# Patient Record
Sex: FEMALE | Race: WHITE
[De-identification: names, ages, dates, MRNs, and addresses within clinical notes are randomized per-mention and may not be internally consistent; named-entity substitution may affect disease eponyms.]

---

## 2022-04-30 ENCOUNTER — HOSPITAL ENCOUNTER (EMERGENCY)
Dept: HOSPITAL 97 - ER | Age: 42
Discharge: HOME | End: 2022-04-30
Payer: SELF-PAY

## 2022-04-30 VITALS — DIASTOLIC BLOOD PRESSURE: 85 MMHG | SYSTOLIC BLOOD PRESSURE: 131 MMHG

## 2022-04-30 VITALS — OXYGEN SATURATION: 97 %

## 2022-04-30 VITALS — TEMPERATURE: 97.9 F

## 2022-04-30 DIAGNOSIS — Z88.8: ICD-10-CM

## 2022-04-30 DIAGNOSIS — M50.10: Primary | ICD-10-CM

## 2022-04-30 DIAGNOSIS — Z85.118: ICD-10-CM

## 2022-04-30 DIAGNOSIS — Z88.2: ICD-10-CM

## 2022-04-30 DIAGNOSIS — Z87.891: ICD-10-CM

## 2022-04-30 PROCEDURE — 99284 EMERGENCY DEPT VISIT MOD MDM: CPT

## 2022-04-30 NOTE — ER
Nurse's Notes                                                                                     

 [facility]                                                                 

Name: Karo Beckham                                                                          

Age: 42 yrs                                                                                       

Sex: Female                                                                                       

: 1980                                                                                   

MRN: [id number]                                                                                   

Arrival Date: 2022                                                                          

Time: 10:03                                                                                       

Account#: [id number]                                                                            

Bed 13                                                                                            

Private MD: Saloni High                                                                        

Diagnosis: Cervical disc disorder with radiculopathy                                              

                                                                                                  

Presentation:                                                                                     

                                                                                             

10:10 Chief complaint: Patient states: "I was told I had messed up vertebrae in my neck and i ab2 

      need surgery but they wont operate until they now what is wrong for sure. I was given       

      tramadol and gabapentin but its not helping, I was also diagnosed with lung cancer on       

      Tuesday." Pt c/o neck pain that radiates into her left shoulder. Coronavirus screen:        

      Vaccine status: Patient reports being unvaccinated. Client denies travel out of the         

      [place] in the last 14 days. At this time, the client does not indicate any symptoms           

      associated with coronavirus-19. Ebola Screen: Patient negative for fever greater than       

      or equal to 101.5 degrees Fahrenheit, and additional compatible Ebola Virus Disease         

      symptoms Patient denies exposure to infectious person. Patient denies travel to an          

      Ebola-affected area in the 21 days before illness onset. No symptoms or risks               

      identified at this time. Initial Sepsis Screen: Does the patient meet any 2 criteria?       

      No. Patient's initial sepsis screen is negative. Does the patient have a suspected          

      source of infection? No. Patient's initial sepsis screen is negative. Risk Assessment:      

      Do you want to hurt yourself or someone else? Patient reports no desire to harm self or     

      others. Onset of symptoms is unknown.                                                       

10:10 Method Of Arrival: Ambulatory                                                           ab2 

10:10 Acuity: GENARO 4                                                                           ab2 

                                                                                                  

Triage Assessment:                                                                                

10:16 General: Appears in no apparent distress. uncomfortable, Behavior is cooperative,       ab2 

      appropriate for age, anxious, crying. General: Behavior is. Pain: Complains of pain in      

      back of neck Pain radiates to left arm. Neuro: Level of Consciousness is awake, alert,      

      obeys commands, Oriented to person, place, time, situation, Appropriate for age.            

      Cardiovascular: No deficits noted. Denies chest pain, shortness of breath, Patient's        

      skin is warm and dry. Respiratory: Airway is patent is compromised Respiratory effort       

      is even, unlabored, Respiratory pattern is regular, symmetrical. GI: No deficits noted.     

      No signs and/or symptoms were reported involving the gastrointestinal system.               

                                                                                                  

OB/GYN:                                                                                           

12:50 LMP N/A - Irregular menses                                                              jd3 

                                                                                                  

Historical:                                                                                       

- Allergies:                                                                                      

10:14 Sulfa (Sulfonamide Antibiotics);                                                        ab2 

10:14 Compazine;                                                                              ab2 

- PMHx:                                                                                           

10:14 Lung cancer;                                                                            ab2 

                                                                                                  

- Immunization history:: Adult Immunizations up to date.                                          

- Social history:: Smoking status: Patient/guardian denies using tobacco, pt quit                 

  smoking 5days ago.                                                                              

                                                                                                  

                                                                                                  

Screening:                                                                                        

10:37 Abuse screen: Denies threats or abuse. Nutritional screening: No deficits noted.        jd3 

      Tuberculosis screening: No symptoms or risk factors identified. Fall Risk Ambulatory        

      Aid- None/Bed Rest/Nurse Assist (0 pts). Gait- Normal/Bed Rest/Wheelchair (0 pts)           

      Mental Status- Oriented to own ability (0 pts). Total Stroud Fall Scale indicates No         

      Risk (0-24 pts).                                                                            

                                                                                                  

Assessment:                                                                                       

10:25 General: Appears in no apparent distress. comfortable, Behavior is calm, cooperative,   jd3 

      appropriate for age. Pain: Complains of pain in left shoulder and back of neck Quality      

      of pain is described as sharp, shooting. Neuro: Level of Consciousness is awake, alert,     

      obeys commands, Oriented to person, place, time, situation. Cardiovascular: Denies          

      chest pain, Capillary refill < 3 seconds Patient's skin is warm and dry. Respiratory:       

      Airway is patent Respiratory effort is even, unlabored, Respiratory pattern is regular,     

      symmetrical, Denies cough, shortness of breath. GI: No signs and/or symptoms were           

      reported involving the gastrointestinal system. : No signs and/or symptoms were           

      reported regarding the genitourinary system. EENT: No signs and/or symptoms were            

      reported regarding the EENT system. Derm: Skin is intact, Skin is dry, Skin is normal,      

      Skin temperature is warm. Musculoskeletal: Circulation, motion, and sensation intact.       

      Range of motion: intact in all extremities.                                                 

11:40 Reassessment: Patient appears in no apparent distress at this time. Patient and/or      jd3 

      family updated on plan of care and expected duration. Pain level reassessed. Patient is     

      alert, oriented x 3, equal unlabored respirations, skin warm/dry/pink. Patient states       

      feeling better.                                                                             

12:51 Reassessment: Patient appears in no apparent distress at this time. Patient and/or      jd3 

      family updated on plan of care and expected duration. Pain level reassessed. Patient is     

      alert, oriented x 3, equal unlabored respirations, skin warm/dry/pink. Patient states       

      feeling better.                                                                             

                                                                                                  

Vital Signs:                                                                                      

10:10  / 100; Pulse 99; Resp 19; Temp 97.9; Pulse Ox 98% on R/A; Weight 56.7 kg; Height ab2 

      5 ft. 5 in. (165.10 cm); Pain 10/10;                                                        

11:40  / 94; Pulse 68; Resp 17 S; Pulse Ox 97% on R/A;                                  jd3 

12:51  / 85; Pulse 65; Resp 17 S; Pulse Ox 97% on R/A;                                  jd3 

10:10 Body Mass Index 20.80 (56.70 kg, 165.10 cm)                                             ab2 

                                                                                                  

ED Course:                                                                                        

10:03 Patient arrived in ED.                                                                  am2 

10:04 Saloni High MD is Private Physician.                                                am2 

10:05 Mike Bernabe NP is [facility].                                                           pm1 

10:05 Jorge Au MD is Attending Physician.                                              pm1 

10:14 Triage completed.                                                                       ab2 

10:17 Arm band placed on right wrist.                                                         ab2 

10:20 Anson Redman RN is Primary Nurse.                                                  jd3 

10:25 Inserted saline lock: 20 gauge in right antecubital area, using aseptic technique.      jd3 

      Blood collected.                                                                            

10:37 Patient has correct armband on for positive identification. Bed in low position. Call   jd3 

      light in reach. Side rails up X 1. Adult w/ patient. Pulse ox on. NIBP on.                  

12:51 No provider procedures requiring assistance completed. IV discontinued, intact,         jd3 

      bleeding controlled, No redness/swelling at site. Pressure dressing applied.                

                                                                                                  

Administered Medications:                                                                         

10:34 Drug: Lidoderm Patch 5 % (700 mg/patch) 1 patches Route: Topical; Site: affected area;  jd3 

11:30 Follow up: Response: No adverse reaction                                                jd3 

10:35 Drug: morphine 4 mg Route: IVP; Site: left antecubital;                                 jd3 

11:30 Follow up: Response: No adverse reaction; RASS: Alert and Calm (0)                      jd3 

10:35 Drug: Zofran (Ondansetron) 4 mg Route: IVP; Site: left antecubital;                     jd3 

11:35 Follow up: Response: No adverse reaction                                                jd3 

11:56 Drug: Ketorolac 30 mg Route: IVP; Site: right antecubital;                              jd3 

12:52 Follow up: Response: No adverse reaction                                                jd3 

                                                                                                  

                                                                                                  

Outcome:                                                                                          

12:21 Discharge ordered by MD.                                                                pm1 

12:51 Discharged to home ambulatory, with family.                                             jd3 

12:51 Condition: stable                                                                           

12:51 Discharge instructions given to patient, family, Instructed on discharge instructions,      

      follow up and referral plans. medication usage, Demonstrated understanding of               

      instructions, follow-up care, medications, Prescriptions given X 1.                         

12:52 Patient left the ED.                                                                    jd3 

                                                                                                  

Signatures:                                                                                       

Mike Bernabe NP                    NP   pm1                                                  

Karen Cho                               am2                                                  

Anson Redman RN                    RN   jd3                                                  

Ricky Rosario                           ab2                                                  

                                                                                                  

Corrections: (The following items were deleted from the chart)                                    

10:16 10:14 Allergies: No Known Allergies; ab2                                                ab2 

                                                                                                  

**************************************************************************************************

## 2022-04-30 NOTE — EDPHYS
Physician Documentation                                                                           

 [facility]                                                                 

Name: Karo Beckham                                                                          

Age: 42 yrs                                                                                       

Sex: Female                                                                                       

: 1980                                                                                   

MRN: [id number]                                                                                   

Arrival Date: 2022                                                                          

Time: 10:03                                                                                       

Account#: [id number]                                                                            

Bed 13                                                                                            

Private MD: Saloni High                                                                        

ED Physician Jorge Au                                                                       

HPI:                                                                                              

                                                                                             

10:19 This 42 yrs old Female presents to ER via Ambulatory with complaints of Neck Problem,   pm1 

      Neck Pain, <24hrs Old.                                                                      

10:19 The patient or guardian complains of pain. The symptoms are located neck pain and left  pm1 

      shoulder. Onset: The symptoms/episode began/occurred 3-4 months of the same pain.           

      Context: The problem was sustained at an unknown location, The neck injury/problem          

      resulted from from unknown cause. Associated signs and symptoms: Pertinent negatives:       

      numbness, tingling. Modifying factors: The symptoms are alleviated by nothing. the          

      symptoms are aggravated by movement, lying down. Severity of symptoms: in the emergency     

      department the symptoms are actually worse. The patient has been recently seen by a         

      physician: Dr. Dong for the same complaint and had MRI of left shoulder on 4/3/2022 and     

      was treated with prescription medications and rehabilitation. Then had MRI of neck on       

      2022 when she had no improvement from rehabilitation and the findings show            

      herniation of disk. Patient presenting here to the ER today for pain management.            

      Patient was sent to pain management from orthopedics but was not treated because she        

      was told that the issue is surgical.                                                        

                                                                                                  

OB/GYN:                                                                                           

12:50 LMP N/A - Irregular menses                                                              jd3 

                                                                                                  

Historical:                                                                                       

- Allergies:                                                                                      

10:14 Sulfa (Sulfonamide Antibiotics);                                                        ab2 

10:14 Compazine;                                                                              ab2 

- PMHx:                                                                                           

10:14 Lung cancer;                                                                            ab2 

                                                                                                  

- Immunization history:: Adult Immunizations up to date.                                          

- Social history:: Smoking status: Patient/guardian denies using tobacco, pt quit                 

  smoking 5days ago.                                                                              

                                                                                                  

                                                                                                  

ROS:                                                                                              

10:19 Constitutional: Negative for fever, chills, and weight loss, Cardiovascular: Negative   pm1 

      for chest pain, palpitations, and edema, Respiratory: Negative for shortness of breath,     

      cough, wheezing, and pleuritic chest pain.                                                  

10:19 Abdomen/GI: Negative for abdominal pain, nausea, vomiting, diarrhea, and constipation,      

      Back: Negative for injury and pain.                                                         

10:19 Skin: Negative for injury, rash, and discoloration, Neuro: Negative for headache,           

      weakness, numbness, tingling, and seizure.                                                  

10:19 Neck: Positive for pain with movement, pain at rest.                                        

10:19 MS/extremity: Positive for pain, of the left shoulder, Negative for injury or acute         

      deformity, decreased range of motion, deformity.                                            

10:19 All other systems are negative.                                                             

                                                                                                  

Exam:                                                                                             

10:19 Constitutional:  This is a well developed, well nourished patient who is awake, alert,  pm1 

      and in no acute distress. Head/Face:  Normocephalic, atraumatic.                            

10:19 Skin:  Warm, dry with normal turgor.  Normal color with no rashes, no lesions, and no       

      evidence of cellulitis. MS/ Extremity:  Pulses equal, no cyanosis.  Neurovascular           

      intact.  Full, normal range of motion.                                                      

10:19 Neck: C-spine: vertebral tenderness, is not appreciated, ROM/movement: no acute changes.    

10:19 Cardiovascular: Exam negative for  acute changes, Rate: normal, Rhythm: regular,            

      Pulses: no pulse deficits are appreciated.                                                  

10:19 Respiratory: Exam negative for  acute changes, respiratory distress, shortness of           

      breath, Breath sounds: are clear throughout.                                                

10:19 Neuro: Exam negative for acute changes, Orientation: is normal, Mentation: is normal,       

      Motor: moves all fours.                                                                     

                                                                                                  

Vital Signs:                                                                                      

10:10  / 100; Pulse 99; Resp 19; Temp 97.9; Pulse Ox 98% on R/A; Weight 56.7 kg; Height ab2 

      5 ft. 5 in. (165.10 cm); Pain 10/10;                                                        

11:40  / 94; Pulse 68; Resp 17 S; Pulse Ox 97% on R/A;                                  jd3 

12:51  / 85; Pulse 65; Resp 17 S; Pulse Ox 97% on R/A;                                  jd3 

10:10 Body Mass Index 20.80 (56.70 kg, 165.10 cm)                                             ab2 

                                                                                                  

MDM:                                                                                              

10:11 Patient medically screened.                                                             pm1 

10:18 Data reviewed: vital signs. Data interpreted: Pulse oximetry: on room air is 98 %.      pm1 

      Interpretation: normal.                                                                     

11:46 ED course: Patient reports improvement in her pain with medication given in the ER.     pm1 

      Patient will need to follow up with neurosurgery for definitive care.                       

12:21 Counseling: I had a detailed discussion with the patient and/or guardian regarding: the pm1 

      historical points, exam findings, and any diagnostic results supporting the                 

      discharge/admit diagnosis, the need for outpatient follow up, for definitive care, a        

      neurosurgeon, to return to the emergency department if symptoms worsen or persist or if     

      there are any questions or concerns that arise at home.                                     

                                                                                                  

Administered Medications:                                                                         

10:34 Drug: Lidoderm Patch 5 % (700 mg/patch) 1 patches Route: Topical; Site: affected area;  jd3 

11:30 Follow up: Response: No adverse reaction                                                jd3 

10:35 Drug: morphine 4 mg Route: IVP; Site: left antecubital;                                 jd3 

11:30 Follow up: Response: No adverse reaction; RASS: Alert and Calm (0)                      jd3 

10:35 Drug: Zofran (Ondansetron) 4 mg Route: IVP; Site: left antecubital;                     jd3 

11:35 Follow up: Response: No adverse reaction                                                jd3 

11:56 Drug: Ketorolac 30 mg Route: IVP; Site: right antecubital;                              jd3 

12:52 Follow up: Response: No adverse reaction                                                jd3 

                                                                                                  

                                                                                                  

Disposition:                                                                                      

15:48 Co-signature as Attending Physician, Jorge Au MD I agree with the assessment and   kdr 

      plan of care.                                                                               

                                                                                                  

Disposition Summary:                                                                              

22 12:21                                                                                    

Discharge Ordered                                                                                 

      Location: Home                                                                          pm1 

      Problem: new                                                                            pm1 

      Symptoms: have improved                                                                 pm1 

      Condition: Stable                                                                       pm1 

      Diagnosis                                                                                   

        - Cervical disc disorder with radiculopathy                                           pm1 

      Followup:                                                                               pm1 

        - With: Emergency Department                                                               

        - When: As needed                                                                          

        - Reason: Worsening of condition                                                           

      Followup:                                                                               pm1 

        - With: Private Physician                                                                  

        - When: 2 - 3 days                                                                         

        - Reason: Recheck today's complaints, Continuance of care, Re-evaluation by your           

      physician                                                                                   

      Discharge Instructions:                                                                     

        - Discharge Summary Sheet                                                             pm1 

        - Cervical Radiculopathy                                                              pm1 

      Forms:                                                                                      

        - Medication Reconciliation Form                                                      pm1 

        - Thank You Letter                                                                    pm1 

        - Antibiotic Education                                                                pm1 

        - Prescription Opioid Use                                                             pm1 

      Prescriptions:                                                                              

        - Tylenol-Codeine #3 300 mg-30 mg Oral                                                     

            - take 2 tablet by ORAL route every 6 hours As needed; 20 tablet; Refills: 0,     pm1 

      Product Selection Permitted                                                                 

Signatures:                                                                                       

Jorge Au MD MD kdr Marinas, Patrick, NP                    NP   pm1                                                  

Redman, Anson, RN                    RN   jd3                                                  

Ricky Rosario                           ab2                                                  

                                                                                                  

Corrections: (The following items were deleted from the chart)                                    

10:16 10:14 Allergies: No Known Allergies; ab2                                                ab2 

                                                                                                  

**************************************************************************************************

## 2022-05-07 ENCOUNTER — HOSPITAL ENCOUNTER (EMERGENCY)
Dept: HOSPITAL 97 - ER | Age: 42
Discharge: HOME | End: 2022-05-07
Payer: SELF-PAY

## 2022-05-07 VITALS — OXYGEN SATURATION: 99 % | DIASTOLIC BLOOD PRESSURE: 87 MMHG | SYSTOLIC BLOOD PRESSURE: 139 MMHG

## 2022-05-07 VITALS — TEMPERATURE: 98.4 F

## 2022-05-07 DIAGNOSIS — Z85.118: ICD-10-CM

## 2022-05-07 DIAGNOSIS — Z88.8: ICD-10-CM

## 2022-05-07 DIAGNOSIS — M50.10: Primary | ICD-10-CM

## 2022-05-07 DIAGNOSIS — Z88.2: ICD-10-CM

## 2022-05-07 PROCEDURE — 99283 EMERGENCY DEPT VISIT LOW MDM: CPT

## 2022-05-07 PROCEDURE — 96374 THER/PROPH/DIAG INJ IV PUSH: CPT

## 2022-05-07 PROCEDURE — 93005 ELECTROCARDIOGRAM TRACING: CPT

## 2022-05-07 PROCEDURE — 96375 TX/PRO/DX INJ NEW DRUG ADDON: CPT

## 2022-05-07 NOTE — ER
Nurse's Notes                                                                                     

 [facility]                                                                 

Name: Karo Beckham                                                                          

Age: 42 yrs                                                                                       

Sex: Female                                                                                       

: 1980                                                                                   

MRN: [id number]                                                                                   

Arrival Date: 2022                                                                          

Time: 18:02                                                                                       

Account#: [id number]                                                                            

Bed 13                                                                                            

Private MD:                                                                                       

Diagnosis: Cervical disc disorder with radiculopathy                                              

                                                                                                  

Presentation:                                                                                     

                                                                                             

18:14 Chief complaint: Patient states: L neck and shoulder pain x 5 months with increasing    ss  

      pain over the past few days. Pt has had an MRI and followed up with an ortho doctor who     

      instructed her that if her pain is unbearable to come to ED. Coronavirus screen: Client     

      denies travel out of the [place] in the last 14 days. Ebola Screen: Patient denies             

      exposure to infectious person. Patient denies travel to an Ebola-affected area in the       

      21 days before illness onset. Initial Sepsis Screen: Does the patient meet any 2            

      criteria? No. Patient's initial sepsis screen is negative. Does the patient have a          

      suspected source of infection? No. Patient's initial sepsis screen is negative. Risk        

      Assessment: Do you want to hurt yourself or someone else? Patient reports no desire to      

      harm self or others. Onset of symptoms is unknown.                                          

18:14 Method Of Arrival: Ambulatory                                                           ss  

18:14 Acuity: GENARO 3                                                                           ss  

                                                                                                  

OB/GYN:                                                                                           

20:15 LMP                                                                                 sm5 

                                                                                                  

Historical:                                                                                       

- Allergies:                                                                                      

18:16 Compazine;                                                                              ss  

18:16 Sulfa (Sulfonamide Antibiotics);                                                        ss  

- PMHx:                                                                                           

18:16 Lung Cancer;                                                                            ss  

                                                                                                  

- Immunization history:: Client reports having NOT received the Covid vaccine.                    

- Social history:: Smoking status: Patient/guardian denies using tobacco, Stopped _               

  months ago .5.                                                                                  

                                                                                                  

                                                                                                  

Screenin:45 Abuse screen: Denies threats or abuse. Denies injuries from another. Nutritional        sm5 

      screening: No deficits noted. Tuberculosis screening: No symptoms or risk factors           

      identified. Fall Risk None identified.                                                      

                                                                                                  

Assessment:                                                                                       

19:44 General: Appears in no apparent distress. Behavior is cooperative. Pain: Complains of   sm5 

      pain in back, chest and left arm. Neuro: No deficits noted. Level of Consciousness is       

      awake, alert, obeys commands, Oriented to person, place, time, situation.                   

      Cardiovascular: Reports chest heaviness Capillary refill < 3 seconds Patient's skin is      

      warm and dry. Respiratory: No deficits noted. Airway is patent Trachea midline              

      Respiratory effort is even, unlabored. GI: No deficits noted. Musculoskeletal: Reports      

      pain in back and left arm.                                                                  

20:15 Reassessment: No changes from previously documented assessment. Patient and/or family   5 

      updated on plan of care and expected duration. Pain level reassessed.                       

                                                                                                  

Vital Signs:                                                                                      

18:14  / 96; Pulse 96; Resp 18; Temp 98.4(TE); Pulse Ox 97% on R/A; Weight 56.7 kg;     ss  

      Height 5 ft. 5 in. (165.10 cm); Pain 10/10;                                                 

20:15  / 87; Pulse 92; Resp 17; Pulse Ox 99% on R/A;                                    sm5 

18:14 Body Mass Index 20.80 (56.70 kg, 165.10 cm)                                               

                                                                                                  

ED Course:                                                                                        

18:02 Patient arrived in ED.                                                                  ds1 

18:16 Triage completed.                                                                       ss  

18:16 Arm band placed on right wrist.                                                         ss  

18:26 Mike Bernabe NP is PHCP.                                                           pm1 

18:27 Renato iEsenberg MD is Attending Physician.                                             pm1 

18:57 Patient placed in an exam room, on a stretcher.                                         ll1 

19:03 Inserted saline lock: 20 gauge in right antecubital area, using aseptic technique.      mb7 

19:11 Tamara Sainz, RN is Primary Nurse.                                                      sm5 

19:46 Patient has correct armband on for positive identification. Bed in low position. Call   sm5 

      light in reach. Side rails up X2.                                                           

20:16 No provider procedures requiring assistance completed. IV discontinued, intact,         sm5 

      bleeding controlled, No redness/swelling at site. Pressure dressing applied.                

                                                                                                  

Administered Medications:                                                                         

19:24 Drug: Lidoderm Patch 5 % (700 mg/patch) 1 patches {Note: left shoulder blade .} Route:  sm5 

      Topical; Site: affected area;                                                               

20:14 Follow up: Response: No adverse reaction                                                5 

19:25 Drug: morphine 4 mg Route: IVP; Site: right antecubital;                                sm5 

20:14 Follow up: Response: Pain is unchanged, physician notified                              5 

19:25 Drug: Zofran (Ondansetron) 4 mg Route: IVP; Site: right antecubital;                    sm5 

20:14 Follow up: Response: No adverse reaction                                                5 

19:25 Drug: Ketorolac 30 mg Route: IVP; Site: right antecubital;                              5 

20:13 Follow up: Response: No adverse reaction                                                5 

20:10 Drug: fentaNYL (PF) 25 mcg Route: IVP; Site: right antecubital;                         5 

20:16 Follow up: Response: Pain is decreased                                                  [facility] 

                                                                                                  

                                                                                                  

Outcome:                                                                                          

19:59 Discharge ordered by MD.                                                                pm1 

20:16 Discharged to home ambulatory.                                                          5 

20:16 Condition: stable                                                                           

20:16 Discharge instructions given to patient, Instructed on discharge instructions, follow       

      up and referral plans. medication usage, Demonstrated understanding of instructions,        

      follow-up care, medications, Prescriptions given X 3.                                       

20:16 Patient left the ED.                                                                    [facility] 

                                                                                                  

Signatures:                                                                                       

Janett Jerez                                ds1                                                  

Janell Broderick RN                      RN   Mike Gregory, ERICA                    NP   pm1                                                  

Maria Del Carmen Hassan RN RN   1                                                  

Destiny Douglas                               mb7                                                  

Tamara Sainz RN RN   sm5                                                  

                                                                                                  

**************************************************************************************************

## 2022-05-07 NOTE — EDPHYS
Physician Documentation                                                                           

 [facility]                                                                 

Name: Karo Beckham                                                                          

Age: 42 yrs                                                                                       

Sex: Female                                                                                       

: 1980                                                                                   

MRN: [id number]                                                                                   

Arrival Date: 2022                                                                          

Time: 18:02                                                                                       

Account#: [id number]                                                                            

Bed 13                                                                                            

Private MD:                                                                                       

Renato Lenz                                                                      

HPI:                                                                                              

                                                                                             

18:40 This 42 yrs old Female presents to ER via Ambulatory with complaints of Back Pain,      pm1 

      Shoulder Pain.                                                                              

18:40 The patient presents with pain that is chronic, with no known mechanism of injury. The  pm1 

      symptoms are located in the neck pain and left shoulder. Onset: The symptoms/episode        

      began/occurred 3-4 months of the same pain. Radiation to back and left shoulder.            

      Associated signs and symptoms: Pertinent negatives: numbness, tingling. Modifying           

      factors: The patient symptoms are alleviated by nothing. Severity of symptoms: in the       

      emergency department the symptoms are actually worse. The patient has been recently         

      seen by a physician: a neurosurgeon, with similar presenting complaints, patient with       

      zoom visit with neurosurgery and was referred to pain management for steroid injections     

      to the neck. Patient has pending appointment for pet scan next week. Patient does not       

      have any pain medications at home currently.                                                

                                                                                                  

OB/GYN:                                                                                           

20:15 LMP                                                                                 sm5 

                                                                                                  

Historical:                                                                                       

- Allergies:                                                                                      

18:16 Compazine;                                                                              ss  

18:16 Sulfa (Sulfonamide Antibiotics);                                                        ss  

- PMHx:                                                                                           

18:16 Lung Cancer;                                                                            ss  

                                                                                                  

- Immunization history:: Client reports having NOT received the Covid vaccine.                    

- Social history:: Smoking status: Patient/guardian denies using tobacco, Stopped _               

  months ago .5.                                                                                  

                                                                                                  

                                                                                                  

ROS:                                                                                              

18:40 Constitutional: Negative for fever, chills, and weight loss.                            pm1 

18:40 Respiratory: Negative for shortness of breath, cough, wheezing, and pleuritic chest         

      pain, Abdomen/GI: Negative for abdominal pain, nausea, vomiting, diarrhea, and              

      constipation.                                                                               

18:40 MS/Extremity: Negative for injury and deformity, Skin: Negative for injury, rash, and       

      discoloration, Neuro: Negative for headache, weakness, numbness, tingling, and seizure.     

18:40 Neck: Positive for of the left trapezius.                                                   

18:40 Cardiovascular: Positive for chest pain, of the left supraclavicular area and left          

      clavicle - reports that it feels bigger than the other side.                                

18:40 Back: Positive for of the thoracic area.                                                    

18:40 All other systems are negative.                                                             

                                                                                                  

Exam:                                                                                             

18:40 Constitutional:  This is a well developed, well nourished patient who is awake, alert,  pm1 

      and in no acute distress. Head/Face:  Normocephalic, atraumatic.                            

18:40 Skin:  Warm, dry with normal turgor.  Normal color with no rashes, no lesions, and no       

      evidence of cellulitis. MS/ Extremity:  Pulses equal, no cyanosis.  Neurovascular           

      intact.  Full, normal range of motion.                                                      

18:40 Neck: External neck: tenderness, of the  left trapezius.                                    

18:40 Chest/axilla: Inspection: normal, Palpation: tenderness, that is mild, of the  left         

      supraclavicular area and left clavicle, that totally reproduces the patient's               

      complaints.                                                                                 

18:40 Cardiovascular: Exam negative for  acute changes, Rate: normal, Rhythm: regular,            

      Pulses: no pulse deficits are appreciated.                                                  

18:40 Respiratory: Exam negative for  acute changes, respiratory distress, shortness of           

      breath.                                                                                     

18:40 Abdomen/GI: Exam negative for acute changes, Inspection: abdomen appears normal,            

      Palpation: abdomen is soft and non-tender, in all quadrants.                                

18:40 Back: muscle spasm, is appreciated in the left trapezius, left scapular area and left       

      subscapular area.                                                                           

18:40 Neuro: Exam negative for acute changes, Orientation: is normal, Mentation: is normal,       

      Motor: is normal, moves all fours.                                                          

                                                                                                  

Vital Signs:                                                                                      

18:14  / 96; Pulse 96; Resp 18; Temp 98.4(TE); Pulse Ox 97% on R/A; Weight 56.7 kg;     ss  

      Height 5 ft. 5 in. (165.10 cm); Pain 10/10;                                                 

20:15  / 87; Pulse 92; Resp 17; Pulse Ox 99% on R/A;                                    sm5 

18:14 Body Mass Index 20.80 (56.70 kg, 165.10 cm)                                             ss  

                                                                                                  

MDM:                                                                                              

18:57 Patient medically screened.                                                             pm1 

19:50 Data reviewed: vital signs. Data interpreted: Pulse oximetry: on room air is 97 %.      pm1 

      Interpretation: normal.                                                                     

19:57 Counseling: I had a detailed discussion with the patient and/or guardian regarding: the pm1 

      historical points, exam findings, and any diagnostic results supporting the                 

      discharge/admit diagnosis, the need for outpatient follow up, a neurosurgeon, a pain        

      , to return to the emergency department if symptoms worsen or          

      persist or if there are any questions or concerns that arise at home.                       

20:03 ED course: pmpaware reviewed.                                                           pm1 

                                                                                                  

                                                                                             

18:39 Order name: IV Saline Lock; Complete Time: 19:03                                        pm1 

                                                                                             

19:25 Order name: EKG - Nurse/Tech; Complete Time: 19:38                                      sm5 

                                                                                                  

EC:42 Rate is 80 beats/min. Rhythm is regular, Normal Sinus Rhythm with No ectopy. QRS        pm1 

      interval is normal. No Q waves. T waves are Normal. No ST changes noted. Clinical           

      impression: Normal ECG.                                                                     

                                                                                                  

Administered Medications:                                                                         

19:24 Drug: Lidoderm Patch 5 % (700 mg/patch) 1 patches {Note: left shoulder blade .} Route:  sm5 

      Topical; Site: affected area;                                                               

20:14 Follow up: Response: No adverse reaction                                                sm5 

19:25 Drug: morphine 4 mg Route: IVP; Site: right antecubital;                                sm5 

20:14 Follow up: Response: Pain is unchanged, physician notified                              sm5 

19:25 Drug: Zofran (Ondansetron) 4 mg Route: IVP; Site: right antecubital;                    sm5 

20:14 Follow up: Response: No adverse reaction                                                sm5 

19:25 Drug: Ketorolac 30 mg Route: IVP; Site: right antecubital;                              sm5 

20:13 Follow up: Response: No adverse reaction                                                sm5 

20:10 Drug: fentaNYL (PF) 25 mcg Route: IVP; Site: right antecubital;                         sm5 

20:16 Follow up: Response: Pain is decreased                                                  sm5 

                                                                                                  

                                                                                                  

Disposition Summary:                                                                              

22 19:59                                                                                    

Discharge Ordered                                                                                 

      Location: Home                                                                          pm1 

      Problem: new                                                                            pm1 

      Symptoms: have improved                                                                 pm1 

      Condition: Stable                                                                       pm1 

      Diagnosis                                                                                   

        - Cervical disc disorder with radiculopathy                                           pm1 

      Followup:                                                                               pm1 

        - With: Emergency Department                                                               

        - When: As needed                                                                          

        - Reason: Worsening of condition                                                           

      Followup:                                                                               pm1 

        - With: Private Physician                                                                  

        - When: 2 - 3 days                                                                         

        - Reason: Recheck today's complaints, Continuance of care, Re-evaluation by your           

      physician                                                                                   

      Discharge Instructions:                                                                     

        - Discharge Summary Sheet                                                             pm1 

        - Cervical Radiculopathy                                                              pm1 

      Forms:                                                                                      

        - Medication Reconciliation Form                                                      pm1 

        - Thank You Letter                                                                    pm1 

        - Antibiotic Education                                                                pm1 

        - Prescription Opioid Use                                                             pm1 

      Prescriptions:                                                                              

        - Lidoderm 5 % Topical adhesive patch,medicated                                            

            - apply 1 patch by TRANSDERMAL route once daily As needed 12 hours on and 12      pm1 

      hours off in a 24 hour period; 10 patch; Refills: 0, Product Selection Permitted            

        - Cyclobenzaprine 10 mg Oral Tablet                                                        

            - take 1 tablet by ORAL route every 8 hours As needed; 30 tablet; Refills: 0,     pm1 

      Product Selection Permitted                                                                 

        - Tylenol-Codeine #3 300 mg-30 mg Oral                                                     

            - take 2 tablet by ORAL route every 6 hours As needed; 20 tablet; Refills: 0,     pm1 

      Product Selection Permitted                                                                 

Signatures:                                                                                       

Janell Broderick RN                      RN   ss                                                   

Mike Bernabe, ERICA                    NP   pm1                                                  

Tamara Sainz RN                        RN   sm5                                                  

                                                                                                  

**************************************************************************************************

## 2022-05-09 NOTE — EKG
Test Date:    2022-05-07               Test Time:    19:35:11

Technician:   JACKIE                                     

                                                     

MEASUREMENT RESULTS:                                       

Intervals:                                           

Rate:         80                                     

WV:           136                                    

QRSD:         96                                     

QT:           372                                    

QTc:          429                                    

Axis:                                                

P:            63                                     

WV:           136                                    

QRS:          24                                     

T:            47                                     

                                                     

INTERPRETIVE STATEMENTS:                                       

                                                     

Normal sinus rhythm

Normal ECG

No previous ECG available for comparison



Electronically Signed On 05-09-22 08:56:53 CDT by Tom Fonseca

## 2022-05-16 ENCOUNTER — HOSPITAL ENCOUNTER (EMERGENCY)
Dept: HOSPITAL 97 - ER | Age: 42
Discharge: HOME | End: 2022-05-16
Payer: SELF-PAY

## 2022-05-16 VITALS — OXYGEN SATURATION: 96 %

## 2022-05-16 VITALS — TEMPERATURE: 97.8 F | DIASTOLIC BLOOD PRESSURE: 105 MMHG | SYSTOLIC BLOOD PRESSURE: 133 MMHG

## 2022-05-16 DIAGNOSIS — Z20.822: ICD-10-CM

## 2022-05-16 DIAGNOSIS — J91.8: ICD-10-CM

## 2022-05-16 DIAGNOSIS — Z88.2: ICD-10-CM

## 2022-05-16 DIAGNOSIS — Z85.118: ICD-10-CM

## 2022-05-16 DIAGNOSIS — Z85.72: ICD-10-CM

## 2022-05-16 DIAGNOSIS — J98.11: Primary | ICD-10-CM

## 2022-05-16 DIAGNOSIS — K59.09: ICD-10-CM

## 2022-05-16 LAB
ALBUMIN SERPL BCP-MCNC: 3.3 G/DL (ref 3.4–5)
ALP SERPL-CCNC: 87 U/L (ref 45–117)
ALT SERPL W P-5'-P-CCNC: 32 U/L (ref 12–78)
AST SERPL W P-5'-P-CCNC: 16 U/L (ref 15–37)
BUN BLD-MCNC: 10 MG/DL (ref 7–18)
GLUCOSE SERPLBLD-MCNC: 105 MG/DL (ref 74–106)
HCT VFR BLD CALC: 38.8 % (ref 36–45)
INR BLD: 1.09
LIPASE SERPL-CCNC: 44 U/L (ref 73–393)
LYMPHOCYTES # SPEC AUTO: 1.9 K/UL (ref 0.7–4.9)
MAGNESIUM SERPL-MCNC: 2 MG/DL (ref 1.8–2.4)
PMV BLD: 7.2 FL (ref 7.6–11.3)
POTASSIUM SERPL-SCNC: 3.2 MMOL/L (ref 3.5–5.1)
RBC # BLD: 4.49 M/UL (ref 3.86–4.86)
SP GR UR: 1.02 (ref 1–1.03)
TROPONIN I SERPL HS-MCNC: < 3 PG/ML (ref ?–58.9)

## 2022-05-16 PROCEDURE — 81003 URINALYSIS AUTO W/O SCOPE: CPT

## 2022-05-16 PROCEDURE — 83605 ASSAY OF LACTIC ACID: CPT

## 2022-05-16 PROCEDURE — 87040 BLOOD CULTURE FOR BACTERIA: CPT

## 2022-05-16 PROCEDURE — 96374 THER/PROPH/DIAG INJ IV PUSH: CPT

## 2022-05-16 PROCEDURE — 96375 TX/PRO/DX INJ NEW DRUG ADDON: CPT

## 2022-05-16 PROCEDURE — 85025 COMPLETE CBC W/AUTO DIFF WBC: CPT

## 2022-05-16 PROCEDURE — 84484 ASSAY OF TROPONIN QUANT: CPT

## 2022-05-16 PROCEDURE — 87804 INFLUENZA ASSAY W/OPTIC: CPT

## 2022-05-16 PROCEDURE — 84145 PROCALCITONIN (PCT): CPT

## 2022-05-16 PROCEDURE — 74177 CT ABD & PELVIS W/CONTRAST: CPT

## 2022-05-16 PROCEDURE — 71275 CT ANGIOGRAPHY CHEST: CPT

## 2022-05-16 PROCEDURE — 83735 ASSAY OF MAGNESIUM: CPT

## 2022-05-16 PROCEDURE — 81025 URINE PREGNANCY TEST: CPT

## 2022-05-16 PROCEDURE — 83690 ASSAY OF LIPASE: CPT

## 2022-05-16 PROCEDURE — 80053 COMPREHEN METABOLIC PANEL: CPT

## 2022-05-16 PROCEDURE — 85730 THROMBOPLASTIN TIME PARTIAL: CPT

## 2022-05-16 PROCEDURE — 36415 COLL VENOUS BLD VENIPUNCTURE: CPT

## 2022-05-16 PROCEDURE — 71045 X-RAY EXAM CHEST 1 VIEW: CPT

## 2022-05-16 PROCEDURE — 93005 ELECTROCARDIOGRAM TRACING: CPT

## 2022-05-16 PROCEDURE — 85610 PROTHROMBIN TIME: CPT

## 2022-05-16 PROCEDURE — 82947 ASSAY GLUCOSE BLOOD QUANT: CPT

## 2022-05-16 PROCEDURE — 99284 EMERGENCY DEPT VISIT MOD MDM: CPT

## 2022-05-16 NOTE — RAD REPORT
EXAM DESCRIPTION:

RAD - Chest Single View - 5/16/2022 4:51 am

 

CLINICAL HISTORY:  42 years Female, FEVER

 

COMPARISON:  None

 

TECHNIQUE:  Single portable x-ray view of the chest performed on 5/16/2022 at 3:45 AM

 

FINDINGS:  The lungs are well-expanded. There is volume loss in the left lung base likely due to a co
mbination of pleural fluid and atelectasis. There is no evidence of a pneumothorax.

The cardiac silhouette is prominent and may be accentuated by the portable technique.

There is widening of the superior mediastinum concerning for possible lymphadenopathy or other mass l
esion.

No acute osseous abnormality is identified.

No acute soft tissue abnormalities are seen.

Lines and tubes:   None.

Free air: None

 

IMPRESSION:  1.   Volume loss in the left lung base likely due to a combination of pleural fluid and 
atelectasis.

2.   Widening of the superior mediastinum concerning for possible lymphadenopathy or other mass lesio
n.

3.   Prominence of the cardiac silhouette.

 

Electronically signed by:   Elizabeth Guerrero DO   5/16/2022 5:45 AM CDT Workstation: [id number]

 

 

Due to temporary technical issues with the PACS/Fluency reporting system, reports are being signed by
 the in house radiologist without review as a courtesy to ensure prompt reporting. The interpreting r
adiologist is fully responsible for the content of the report.

## 2022-05-16 NOTE — ER
Nurse's Notes                                                                                     

 [facility]                                                                 

Name: Karo Beckham                                                                          

Age: 42 yrs                                                                                       

Sex: Female                                                                                       

: 1980                                                                                   

MRN: [id number]                                                                                   

Arrival Date: 2022                                                                          

Time: 02:21                                                                                       

Account#: [id number]                                                                            

Bed 3                                                                                             

Private MD:                                                                                       

Diagnosis: Atelectasis;Pleural effusion in other conditions classified elsewhere-history of       

  lymphoma;Abdominal pain, Generalized;Constipation                                               

                                                                                                  

Presentation:                                                                                     

                                                                                             

02:34 Chief complaint: Patient states: I have been constipated for weeks. I have been trying  jb4 

      to take stool softeners and laxatives. Tonight the pain is unbearable. And had low          

      grade fever. I am having chest pain on the left side. Coronavirus screen: At this time,     

      the client does not indicate any symptoms associated with coronavirus-19. Ebola Screen:     

      No symptoms or risks identified at this time. Initial Sepsis Screen: Does the patient       

      meet any 2 criteria? RR > 20 per min. HR > 90 bpm. Yes Does the patient have a              

      suspected source of infection? Yes: Acute abdominal pain If YES to both, name of            

      provider notified: Jadon Keyes MD Risk Assessment: Do you want to hurt yourself or       

      someone else? Patient reports no desire to harm self or others. Onset of symptoms was       

      May 16, 2022.                                                                               

02:34 Method Of Arrival: Ambulatory                                                           jb4 

02:34 Acuity: GENARO 2                                                                           jb4 

                                                                                                  

Triage Assessment:                                                                                

02:35 General: Appears in no apparent distress. uncomfortable, ill, Behavior is cooperative,  jb4 

      anxious, crying. Pain: Complains of pain in low back area, mid back area, left lateral      

      anterior chest, abdomen, left arm and neck Pain does not radiate. Pain currently is 10      

      out of 10 on a pain scale. GI: Abdomen is flat, non-distended, Reports constipation,        

      nausea, Last bm 2-3 weeks ago.                                                              

                                                                                                  

Historical:                                                                                       

- Allergies:                                                                                      

02:38 Compazine;                                                                              jb4 

02:38 Sulfa (Sulfonamide Antibiotics);                                                        jb4 

- Home Meds:                                                                                      

02:38 Colace oral [Active]; gabapentin 300 mg oral cap [Active]; Hydrocodone-Acetaminophen    jb4 

      Oral [Active]; tizanidine oral [Active]; Omeprazole Oral [Active];                          

- PMHx:                                                                                           

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 

                                                                                                  

- Immunization history:: Adult Immunizations up to date.                                          

- Social history:: Smoking status: Patient/guardian denies using tobacco,                         

  Patient/guardian denies using alcohol.                                                          

                                                                                                  

                                                                                                  

Screenin:00 Abuse screen: Denies threats or abuse. Nutritional screening: No deficits noted.        jb4 

      Tuberculosis screening: No symptoms or risk factors identified.                             

03:00 Fall Risk None identified.                                                              jb4 

                                                                                                  

Assessment:                                                                                       

03:00 Reassessment: Pt states " I do not want to be resuscitated if my heart stops.".         jb4 

      Provider notified.                                                                          

03:38 General: Appears uncomfortable, Behavior is cooperative, crying, restless. Pain:        ll3 

      Complains of pain in abdomen and chest and back Pain currently is 10 out of 10 on a         

      pain scale. Neuro: Level of Consciousness is awake, alert, obeys commands, Oriented to      

      person, place, time, situation. GI: Abdomen is round non-distended, Reports lower           

      abdominal pain, upper abdominal pain, constipation, nausea, vomiting. GI: Reports.          

      Derm: Skin is pink, warm \T\ dry. Musculoskeletal: Circulation, motion, and sensation       

      intact.                                                                                     

05:52 Reassessment: No changes from previously documented assessment. Patient and/or family   ll3 

      updated on plan of care and expected duration. Pain level reassessed. Patient is alert,     

      oriented x 3, equal unlabored respirations, skin warm/dry/pink.                             

06:50 Reassessment: Patient appears in no apparent distress at this time. No changes from     lg3 

      previously documented assessment. Patient and/or family updated on plan of care and         

      expected duration. Pain level reassessed. Patient is alert, oriented x 3, equal             

      unlabored respirations, skin warm/dry/pink. pt states pain is still present but is          

      tolerable at this time.                                                                     

06:53 GI: Bowel sounds present X 4 quads. Abd is soft Abdomen is tender to palpation.         lg3 

07:48 Reassessment: Patient appears in no apparent distress at this time. Patient and/or      ph  

      family updated on plan of care and expected duration. Pain level reassessed. Patient is     

      alert, oriented x 3, equal unlabored respirations, skin warm/dry/pink. Pt d/c home w/       

      SO.                                                                                         

                                                                                                  

Vital Signs:                                                                                      

02:34  / 95; Pulse 117; Resp 24; Temp 98.0(TE); Pulse Ox 100% on R/A; Weight 56.7 kg    jb4 

      (R); Height 5 ft. 5 in. (165.10 cm) (R); Pain 10/10;                                        

03:50  / 91; Pulse 92; Resp 28; Pulse Ox 100% on R/A;                                   lg3 

05:49  / 87; Pulse 96; Resp 16; Pulse Ox 96% on R/A;                                    ll3 

06:51  / 97; Pulse 97; Resp 18 S; Pulse Ox 96% on R/A;                                  lg3 

07:46  / 105; Pulse 99; Resp 18; Temp 97.8; Pulse Ox 96% on R/A;                        ph  

02:34 Body Mass Index 20.80 (56.70 kg, 165.10 cm)                                             jb4 

                                                                                                  

ED Course:                                                                                        

02:21 Patient arrived in ED.                                                                  bp1 

02:37 Triage completed.                                                                       jb4 

02:38 Arm band placed on right wrist.                                                         jb4 

02:51 Jadon Keyes MD is Attending Physician.                                             mh7 

03:00 Patient has correct armband on for positive identification. Placed in gown. Bed in low  jb4 

      position. Call light in reach. Side rails up X 1. Client placed on continuous cardiac       

      and pulse oximetry monitoring. NIBP monitoring applied.                                     

03:00 Initial lab(s) drawn, by me, sent to lab. First set of blood cultures drawn by me,      jb4 

      Second set of blood cultures drawn by me. Inserted saline lock: 18 gauge in right           

      antecubital area, using aseptic technique. forearm, using aseptic technique. Blood          

      collected.                                                                                  

03:08 Nell Parsons, RN is Primary Nurse.                                                     lg3 

03:09 Procalcitonin Sent.                                                                     lg3 

03:09 Troponin High Sensitivity Sent.                                                         lg3 

03:10 Blood Culture Adult (2) Sent.                                                           lg3 

03:10 CBC with Diff Sent.                                                                     lg3 

03:10 CMP Sent.                                                                               lg3 

03:10 Lactate Sent.                                                                           lg3 

03:10 Protime (+inr) Sent.                                                                    lg3 

03:10 Ptt, Activated Sent.                                                                    lg3 

03:32 SARS-COV-2 RT PCR Sent.                                                                 lg3 

03:32 Lipase Sent.                                                                            lg3 

03:32 Flu Sent.                                                                               lg3 

03:32 Procalcitonin Sent.                                                                     lg3 

04:40 Urine collected: clean catch specimen, cloudy.                                          wm  

04:53 Chest Single View XRAY In Process Unspecified.                                          EDMS

05:13 CT Chest For PE Angio In Process Unspecified.                                           EDMS

05:13 CT Abd/Pelvis - IV Contrast Only In Process Unspecified.                                EDMS

07:19 Licha Doyle MD is Referral Physician.                                      [facility] 

07:47 No provider procedures requiring assistance completed. IV discontinued, intact,         ph  

      bleeding controlled, No redness/swelling at site. Pressure dressing applied.                

                                                                                                  

Administered Medications:                                                                         

03:22 Drug: NS 0.9% 1000 ml Route: IV; Rate: 1000 ml; Site: right antecubital;                ll3 

03:26 Drug: Zofran (Ondansetron) 4 mg Route: IVP; Site: right antecubital;                    ll3 

03:38 Follow up: Response: No adverse reaction                                                lg3 

03:27 Drug: morphine 4 mg Route: IVP; Site: right antecubital;                                ll3 

03:38 Follow up: Response: No adverse reaction                                                lg3 

05:49 Drug: morphine 4 mg Route: IVP; Site: right antecubital;                                ll3 

07:47 Follow up: Response: No adverse reaction                                                ph  

07:47 Drug: Potassium Effervescent Tablet 50 mEq Route: PO;                                   ph  

07:47 Follow up: Response: No adverse reaction                                                ph  

                                                                                                  

                                                                                                  

Medication:                                                                                       

03:00 VIS not applicable for this client.                                                     jb4 

                                                                                                  

Outcome:                                                                                          

07:22 Discharge ordered by MD.                                                                [facility] 

07:48 Discharged to home ambulatory, with significant other.                                  ph  

07:48 Condition: good                                                                             

07:48 Discharge instructions given to patient, Instructed on discharge instructions, follow       

      up and referral plans. medication usage, Demonstrated understanding of instructions,        

      follow-up care, medications, Prescriptions given X 2.                                       

07:48 Patient left the ED.                                                                    ph  

                                                                                                  

Signatures:                                                                                       

Dispatcher [employer]                           EDMS                                                 

Rebeca Li RN RN                                                      

Donaldo Rainey RN                       KARMA   jb4                                                  

Nell Parsons, KARMA                       RN   3                                                  

Chen Sneed Maurice, MD MD   mh7                                                  

Leigha Ramos Lynsea, RN                      RN   ll3                                                  

                                                                                                  

Corrections: (The following items were deleted from the chart)                                    

03:19 03:09 LIPASE+C.LAB.BRZ drawn and sent. lg3                                              EDMS

03:20 03:00 Reassessment: Pt states " I do not want to be resuscitated if my heart stops." jb4jb4 

03:35 03:31 General: Appears in no apparent distress. uncomfortable, ill, Behavior is         jb4 

      cooperative, anxious, crying, jb4                                                           

 03:31 Pain: Complains of pain in low back area, mid back area, left lateral anterior    jb4 

      chest, abdomen, left arm and neck Pain does not radiate. Pain currently is 10 out of 10     

      on a pain scale. jb4                                                                        

 03:31 GI: Abdomen is flat, non-distended, Reports constipation, nausea, Last bm 2-3     jb4 

      weeks ago jb4                                                                               

                                                                                                  

**************************************************************************************************

## 2022-05-16 NOTE — RAD REPORT
EXAM DESCRIPTION:  CT - Abdomen   Pelvis W Contrast - 5/16/2022 7:10 am

 

TECHNIQUE:  Computerized axial tomography of the abdomen and pelvis was performed after the administr
ation of IV iodinated nonionic contrast. This exam was performed according to our department optimiza
tion program which includes automated exposure control, adjustment of the mA and/or KV according to p
atient size and/or use of iterative reconstruction technique.

 

CLINICAL HISTORY:  Abdominal pain

 

COMPARISON:  None .

 

FINDINGS:  Visualized lower thorax: There is a small amount of pericardial fluid, measuring up to 7 m
m in thickness. There is airspace consolidation with air bronchograms in the left lung base which may
 be related to atelectasis and/or pneumonia. There is either a complex high density left pleural flui
d/hemothorax versus nodular pleural thickening at the posterior left lung base

Liver: Normal size and attenuation.

Spleen: Normal size and attenuation.

Gallbladder and biliary system: There has been cholecystectomy.

Pancreas:   Normal.

Adrenals: Normal.

Kidneys: Normal.

GI tract:   No bowel obstruction or inflammation. Appendix is not identified.

Lymph nodes and mesentery: Mildly prominent retroperitoneal para-aortic lymph nodes are present.

Vasculature: Normal..

Bladder: Normal.

Reproductive organs: Normal.

Peritoneum: No free fluid.

Musculoskeletal structures: No significant abnormality.

Other:   None.

 

IMPRESSION:  There is complex high density in the left pleural space at the left costophrenic sulcus,
 inseparable from the diaphragm, which may be related to small amount of hemothorax versus nodular pl
eural thickening/mass. Recommend follow-up.

Mildly prominent periaortic/retroperitoneal lymph nodes.

 

Electronically signed by:   Kim Keyes MD   5/16/2022 6:45 AM CDT Workstation: [id number]

 

 

Due to temporary technical issues with the PACS/Fluency reporting system, reports are being signed by
 the in house radiologist without review as a courtesy to ensure prompt reporting. The interpreting r
adiologist is fully responsible for the content of the report.

## 2022-05-16 NOTE — RAD REPORT
EXAM DESCRIPTION:  CT - Chest For Pe Angio - 5/16/2022 7:08 am

 

CLINICAL HISTORY:  42 years Female chest pain. Patient diagnosed with lymphoma/lung cancer/distal aor
tic tumor, shortness of breath, abdominal distention

 

CLINICAL HISTORY:  Following the administration of intravenous contrast, multiple high-resolution axi
al images of the chest were performed followed by sagittal and coronal reconstructed images. Coronal 
oblique MIP images were also performed. The CT study is performed according to ALARA (as low as reaso
nably achievable) or ALARA/IMAGE GENTLY, with automatic adjustment of mA and/or kV according to patie
nt size.

Performed on: 5/16/2022 at 4:57 AM

 

COMPARISON:  Prior chest x-ray performed on 5/16/2022 at 3:45 AM. No additional studies were availabl
e for comparison.

 

FINDINGS:  There is   satisfactory visualization and contrast opacification of pulmonary arteries. No
 definite intra-arterial filling defects are identified to suggest acute or chronic pulmonary embolis
m. The thoracic aorta is normal in caliber and contour without evidence of aneurysm or dissection.

The lungs are well-expanded. There is a small left pleural effusion. There is mild left basilar compr
essive atelectasis. There is a round focal area of airspace consolidation along the lateral aspect of
 the left upper lobe measuring approximately 1.6 x 1.5 cm in cross-sectional diameter. Although this 
could represent a focal area of rounded atelectasis, a neoplastic process is not excluded.   The righ
t lung is grossly clear. There is minimal centrilobular and paraseptal emphysema, best appreciated in
 the right upper lobe. There is a small area of groundglass opacification along the right major fissu
re posteriorly (series 402, image 49) which may be due to fibrosis or atelectasis. The central airway
s are patent.

The heart is normal in size. There is a small pericardial effusion measuring approximately 1 cm along
 the lateral aspect of the left ventricle.. There is no reflux of contrast into the hepatic veins to 
suggest right heart strain.The RV/LV ratio is within normal limits.

There is extensive hilar and mediastinal lymphadenopathy. There is bulky anterior mediastinal lymphad
enopathy lateral to the thoracic aortic arch measuring approximately 6.7 x 4.3 cm in cross-sectional 
diameter by approximately 6.9 cm in craniocaudal dimension.

No acute osseous abnormality is identified. There is mild degenerative spurring along the mid thoraci
c spine.

The visualized upper abdominal structures are unremarkable.

 

IMPRESSION:  1.   No evidence of acute or chronic pulmonary embolism, aortic aneurysm or dissection.

2.   Small left pleural effusion with mild left basilar compressive atelectasis.

3.   Rounded focal area of airspace consolidation along the lateral aspect of the left upper lobe lainey
suring approximately 1.6 x 1.5 cm in cross-sectional diameter. Although this could represent a focal 
area of rounded atelectasis, a neoplastic process is not excluded.

4.   Extensive hilar and mediastinal lymphadenopathy most likely related to the patient's reported cl
inical history of lymphoma.

5.   Small pericardial effusion measuring approximately 1 cm along the lateral aspect of the left arlene
tricle.

6.   Minimal centrilobular and paraseptal emphysema, best appreciated in the right upper lobe.

7.   Small area of groundglass opacification along the right major fissure posteriorly which may be d
ue to fibrosis or atelectasis.

 

Electronically signed by:   Elizabeth Guerrero DO   5/16/2022 6:35 AM CDT Workstation: [id number]

 

 

 

Due to temporary technical issues with the PACS/Fluency reporting system, reports are being signed by
 the in house radiologist without review as a courtesy to ensure prompt reporting. The interpreting r
adiologist is fully responsible for the content of the report.

## 2022-05-16 NOTE — EKG
Test Date:    2022-05-16               Test Time:    02:51:40

Technician:   KATHIE                                    

                                                     

MEASUREMENT RESULTS:                                       

Intervals:                                           

Rate:         105                                    

MS:           136                                    

QRSD:         96                                     

QT:           338                                    

QTc:          446                                    

Axis:                                                

P:            69                                     

MS:           136                                    

QRS:          43                                     

T:            45                                     

                                                     

INTERPRETIVE STATEMENTS:                                       

                                                     

Sinus tachycardia

Otherwise normal ECG

Compared to ECG 05/07/2022 19:35:11

Sinus rhythm no longer present



Electronically Signed On 05-16-22 10:02:26 CDT by Tom Fonseca

## 2022-05-16 NOTE — EDPHYS
Physician Documentation                                                                           

 [facility]                                                                 

Name: Karo Beckham                                                                          

Age: 42 yrs                                                                                       

Sex: Female                                                                                       

: 1980                                                                                   

MRN: [id number]                                                                                   

Arrival Date: 2022                                                                          

Time: 02:21                                                                                       

Account#: [id number]                                                                            

Bed 3                                                                                             

Private MD:                                                                                       

ED Physician Jadon Keyes                                                                      

HPI:                                                                                              

                                                                                             

03:05 This 42 yrs old Female presents to ER via Ambulatory with complaints of Constipation,   mh7 

      Fever.                                                                                      

03:05 The patient reports fever, not measured (subjective).                                   mh7 

03:05 Onset: The symptoms/episode began/occurred yesterday. Modifying factors: there are no   mh7 

      obvious modifying factors. Associated signs and symptoms: Pertinent positives:              

      abdominal pain, chest pain, nausea, vomiting. Severity of symptoms: At their worst the      

      symptoms were moderate yesterday, in the emergency department the symptoms have             

      improved moderately. Recently diagnosed with lymphoma.                                      

                                                                                                  

Historical:                                                                                       

- Allergies:                                                                                      

02:38 Compazine;                                                                              jb4 

02:38 Sulfa (Sulfonamide Antibiotics);                                                        jb4 

- Home Meds:                                                                                      

02:38 Colace oral [Active]; gabapentin 300 mg oral cap [Active]; Hydrocodone-Acetaminophen    jb4 

      Oral [Active]; tizanidine oral [Active]; Omeprazole Oral [Active];                          

- PMHx:                                                                                           

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 

                                                                                                  

- Immunization history:: Adult Immunizations up to date.                                          

- Social history:: Smoking status: Patient/guardian denies using tobacco,                         

  Patient/guardian denies using alcohol.                                                          

                                                                                                  

                                                                                                  

ROS:                                                                                              

03:05 Eyes: Negative for injury, pain, redness, and discharge, ENT: Negative for injury,      mh7 

      pain, and discharge, Neck: Negative for injury, pain, and swelling, Respiratory:            

      Negative for shortness of breath, cough, wheezing, and pleuritic chest pain, Back:          

      Negative for injury and pain, : Negative for injury, bleeding, discharge, and             

      swelling, MS/Extremity: Negative for injury and deformity, Skin: Negative for injury,       

      rash, and discoloration, Neuro: Negative for headache, weakness, numbness, tingling,        

      and seizure, Psych: Negative for depression, anxiety, suicide ideation, homicidal           

      ideation, and hallucinations, Allergy/Immunology: Negative for hives, rash, and             

      allergies, Endocrine: Negative for neck swelling, polydipsia, polyuria, polyphagia, and     

      marked weight changes, Hematologic/Lymphatic: Negative for swollen nodes, abnormal          

      bleeding, and unusual bruising.                                                             

                                                                                                  

Exam:                                                                                             

03:05 Head/Face:  Normocephalic, atraumatic. Eyes:  Pupils equal round and reactive to light, mh7 

      extra-ocular motions intact.  Lids and lashes normal.  Conjunctiva and sclera are           

      non-icteric and not injected.  Cornea within normal limits.  Periorbital areas with no      

      swelling, redness, or edema. ENT:  Nares patent. No nasal discharge, no septal              

      abnormalities noted.  Tympanic membranes are normal and external auditory canals are        

      clear.  Oropharynx with no redness, swelling, or masses, exudates, or evidence of           

      obstruction, uvula midline.  Mucous membranes moist. Neck:  Trachea midline, no             

      thyromegaly or masses palpated, and no cervical lymphadenopathy.  Supple, full range of     

      motion without nuchal rigidity, or vertebral point tenderness.  No Meningismus.             

      Chest/axilla:  Normal chest wall appearance and motion.  Nontender with no deformity.       

      No lesions are appreciated. Cardiovascular:  Regular rate and rhythm with a normal S1       

      and S2.  No gallops, murmurs, or rubs.  Normal PMI, no JVD.  No pulse deficits.             

      Respiratory:  Lungs have equal breath sounds bilaterally, clear to auscultation and         

      percussion.  No rales, rhonchi or wheezes noted.  No increased work of breathing, no        

      retractions or nasal flaring.                                                               

03:05 Back:  No spinal tenderness.  No costovertebral tenderness.  Full range of motion.          

      Skin:  Warm, dry with normal turgor.  Normal color with no rashes, no lesions, and no       

      evidence of cellulitis. MS/ Extremity:  Pulses equal, no cyanosis.  Neurovascular           

      intact.  Full, normal range of motion. Neuro:  Awake and alert, GCS 15, oriented to         

      person, place, time, and situation.  Cranial nerves II-XII grossly intact.  Motor           

      strength 5/5 in all extremities.  Sensory grossly intact.  Cerebellar exam normal.          

      Normal gait. Psych:  Awake, alert, with orientation to person, place and time.              

      Behavior, mood, and affect are within normal limits.                                        

03:05 Constitutional: The patient appears in no acute distress, alert, awake, uncomfortable.      

03:05 Abdomen/GI: Inspection: obese Bowel sounds: normal, in all quadrants, Palpation: mild       

      abdominal tenderness, in all quadrants, Rectal exam: the exam is deferred, because of       

      patient request, Indicators: McBurney's point is not tender, Kamara's sign is negative,     

      Rovsing's sign is negative, Obturator sign is negative, Psoas sign is negative, Liver:      

      no appreciated palpable abnormalities, Hernia: not appreciated.                             

                                                                                                  

Vital Signs:                                                                                      

02:34  / 95; Pulse 117; Resp 24; Temp 98.0(TE); Pulse Ox 100% on R/A; Weight 56.7 kg    jb4 

      (R); Height 5 ft. 5 in. (165.10 cm) (R); Pain 10/10;                                        

03:50  / 91; Pulse 92; Resp 28; Pulse Ox 100% on R/A;                                   lg3 

05:49  / 87; Pulse 96; Resp 16; Pulse Ox 96% on R/A;                                    ll3 

06:51  / 97; Pulse 97; Resp 18 S; Pulse Ox 96% on R/A;                                  lg3 

07:46  / 105; Pulse 99; Resp 18; Temp 97.8; Pulse Ox 96% on R/A;                        ph  

02:34 Body Mass Index 20.80 (56.70 kg, 165.10 cm)                                             jb4 

                                                                                                  

MDM:                                                                                              

07:18 Differential diagnosis: viral Infection, bacterial infection, URI, bronchitis,          mh7 

      pneumonia UTI, gastroenteritis. Data reviewed: vital signs, nurses notes, old medical       

      records, lab test result(s), cardiac enzymes, CBC, electrolytes, urinalysis, EKG,           

      radiologic studies, CT scan. Data interpreted: Pulse oximetry: on room air is 97 %.         

      Interpretation: normal. Counseling: I had a detailed discussion with the patient and/or     

      guardian regarding: the historical points, exam findings, and any diagnostic results        

      supporting the discharge/admit diagnosis, the presence of at least one elevated blood       

      pressure reading (>120/80) during this emergency department visit, lab results,             

      radiology results, the need for outpatient follow up, to return to the emergency            

      department if symptoms worsen or persist or if there are any questions or concerns that     

      arise at home. Response to treatment: the patient's symptoms have resolved after            

      treatment, the patient's blood pressure is in an acceptable range, mental status has        

      returned to baseline, the patient no longer shows bradycardia, the patient is not short     

      of breath, the patient is not tachycardic, the patient's pain is gone, the patient's        

      temperature has normalized.                                                                 

07:18 Physician consultation: Licha Beaver MD regarding patient's condition, and    [facility] 

      will see patient in office.                                                                 

07:22 Patient medically screened.                                                             [facility] 

                                                                                                  

                                                                                             

02:58 Order name: Blood Culture Adult (2)                                                     [facility] 

                                                                                             

02:58 Order name: CBC with Diff; Complete Time: 04:28                                         1 

                                                                                             

02:58 Order name: CMP; Complete Time: 05:34                                                   lp1 

                                                                                             

02:58 Order name: Lactate; Complete Time: 04:28                                               1 

                                                                                             

02:58 Order name: Protime (+inr); Complete Time: 04:28                                        1 

                                                                                             

02:58 Order name: Ptt, Activated; Complete Time: 04:28                                        [facility] 

                                                                                             

02:59 Order name: Troponin High Sensitivity; Complete Time: 05:34                             [facility] 

                                                                                             

03:01 Order name: Procalcitonin; Complete Time: 04:28                                         [facility] 

                                                                                             

03:09 Order name: Flu; Complete Time: 04:29                                                   ll3 

                                                                                             

03:19 Order name: Lipase; Complete Time: 05:34                                                [facility]

                                                                                             

03:20 Order name: SARS-COV-2 RT PCR; Complete Time: 06:05                                     [facility]

                                                                                             

03:53 Order name: Glucose, Ancillary Testing; Complete Time: 04:28                            [facility]

                                                                                             

02:58 Order name: Accucheck; Complete Time: 04:23                                             lp1 

                                                                                             

02:58 Order name: Cardiac monitoring; Complete Time: 03:29                                    1 

                                                                                             

02:58 Order name: EKG - Nurse/Tech; Complete Time: 03:08                                      1 

                                                                                             

03:01 Order name: Chest Single View XRAY                                                      [facility] 

                                                                                             

04:29 Order name: CT Chest For PE Angio                                                       [facility] 

                                                                                             

04:29 Order name: CT Abd/Pelvis - IV Contrast Only                                            [facility] 

                                                                                             

04:40 Order name: Urine Pregnancy--Ancillary (enter results); Complete Time: 05:07            ds4 

                                                                                             

04:40 Order name: Urine Dipstick-Ancillary; Complete Time: 05:07                              [facility]

                                                                                             

05:14 Order name: Magnesium; Complete Time: 05:34                                             [facility]

                                                                                             

06:43 Order name: EKG Electrocardiogram                                                       [facility]

                                                                                             

02:58 Order name: IV Saline Lock - Large Bore; Complete Time: 03:08                           1 

                                                                                             

02:58 Order name: Labs collected and sent; Complete Time: 03:08                               1 

                                                                                             

02:58 Order name: O2 Per Protocol; Complete Time: 03:09                                       1 

                                                                                             

02:58 Order name: O2 Sat Monitoring; Complete Time: 03:09                                     1 

                                                                                             

02:58 Order name: Urine Dipstick-Ancillary (obtain specimen); Complete Time: 04:39            1 

                                                                                             

03:01 Order name: Urine Pregnancy Test (obtain specimen); Complete Time: 04:39                [facility] 

                                                                                                  

Administered Medications:                                                                         

03:22 Drug: NS 0.9% 1000 ml Route: IV; Rate: 1000 ml; Site: right antecubital;                ll3 

03:26 Drug: Zofran (Ondansetron) 4 mg Route: IVP; Site: right antecubital;                    ll3 

03:38 Follow up: Response: No adverse reaction                                                lg3 

03:27 Drug: morphine 4 mg Route: IVP; Site: right antecubital;                                ll3 

03:38 Follow up: Response: No adverse reaction                                                lg3 

05:49 Drug: morphine 4 mg Route: IVP; Site: right antecubital;                                ll3 

07:47 Follow up: Response: No adverse reaction                                                ph  

07:47 Drug: Potassium Effervescent Tablet 50 mEq Route: PO;                                   ph  

07:47 Follow up: Response: No adverse reaction                                                ph  

                                                                                                  

                                                                                                  

Disposition Summary:                                                                              

22 07:22                                                                                    

Discharge Ordered                                                                                 

      Location: Home                                                                          [facility] 

      Problem: an ongoing problem                                                             [facility] 

      Symptoms: have improved                                                                 [facility] 

      Condition: Stable                                                                       [facility] 

      Diagnosis                                                                                   

        - Atelectasis                                                                         7 

        - Pleural effusion in other conditions classified elsewhere - history of lymphoma     mh7 

        - Abdominal pain, Generalized                                                         mh7 

        - Constipation                                                                        7 

      Followup:                                                                               7 

        - With: Private Physician                                                                  

        - When: 1 - 2 days                                                                         

        - Reason: Worsening of condition, Recheck today's complaints, Continuance of care,         

      Re-evaluation by your physician                                                             

      Followup:                                                                               7 

        - With: Licha Doyle MD                                                        

        - When: 1 - 2 days                                                                         

        - Reason: Worsening of condition, Recheck today's complaints, Continuance of care,         

      Re-evaluation by your physician                                                             

      Discharge Instructions:                                                                     

        - Discharge Summary Sheet                                                             7 

        - Atelectasis, Adult                                                                  mh7 

        - Constipation, Adult, Easy-to-Read                                                   mh7 

        - Abdominal Pain, Adult, Easy-to-Read                                                 [facility] 

      Forms:                                                                                      

        - Medication Reconciliation Form                                                      [facility] 

        - Thank You Letter                                                                    [facility] 

        - Antibiotic Education                                                                [facility] 

        - Prescription Opioid Use                                                             [facility] 

      Prescriptions:                                                                              

        - ondansetron 4 mg Oral tablet,disintegrating                                              

            - place 1 tablet by TRANSLINGUAL route every 8 hours As needed; 12 tablet;        7 

      Refills: 0, Product Selection Permitted                                                     

        - Lactulose 10 gram/15 mL Oral Solution                                                    

            - take 30 milliliters by ORAL route once daily As needed; 150 milliliter;         mh7 

      Refills: 0, Product Selection Permitted                                                     

Signatures:                                                                                       

Dispatcher [employer]                           EDMS                                                 

Jaclyn Macdonald, RN                         RN   lp1                                                  

Rebeca Li RN                      RN   Donaldo Cooley RN                       RN   jb4                                                  

Jadon Keyes MD MD   [facility]                                                  

Reese Fox RN                      RN   ll3                                                  

Nell Parsons RN   lg3                                                  

                                                                                                  

Corrections: (The following items were deleted from the chart)                                    

03:19 03:03 LIPASE+C.LAB.BRZ ordered. EDMS                                                    EDMS

03:20 03:01 COVID-19/FLU A+B+MOL.LAB.BRZ ordered. EDMS                                        EDMS

05:13 05:08 MAGNESIUM+C.LAB.BRZ ordered. EDMS                                                 EDMS

                                                                                                  

**************************************************************************************************

## 2022-05-22 ENCOUNTER — HOSPITAL ENCOUNTER (EMERGENCY)
Dept: HOSPITAL 97 - ER | Age: 42
Discharge: ANOTHER HOSPITAL | End: 2022-05-22
Payer: SELF-PAY

## 2022-05-22 VITALS — SYSTOLIC BLOOD PRESSURE: 143 MMHG | DIASTOLIC BLOOD PRESSURE: 102 MMHG | OXYGEN SATURATION: 98 % | TEMPERATURE: 97.8 F

## 2022-05-22 DIAGNOSIS — R59.0: ICD-10-CM

## 2022-05-22 DIAGNOSIS — E87.6: ICD-10-CM

## 2022-05-22 DIAGNOSIS — D72.829: ICD-10-CM

## 2022-05-22 DIAGNOSIS — C38.3: Primary | ICD-10-CM

## 2022-05-22 DIAGNOSIS — Z88.2: ICD-10-CM

## 2022-05-22 DIAGNOSIS — Z85.118: ICD-10-CM

## 2022-05-22 DIAGNOSIS — Z88.8: ICD-10-CM

## 2022-05-22 DIAGNOSIS — Z20.822: ICD-10-CM

## 2022-05-22 DIAGNOSIS — J98.59: ICD-10-CM

## 2022-05-22 LAB
ALBUMIN SERPL BCP-MCNC: 3.1 G/DL (ref 3.4–5)
ALP SERPL-CCNC: 87 U/L (ref 45–117)
ALT SERPL W P-5'-P-CCNC: 47 U/L (ref 12–78)
AST SERPL W P-5'-P-CCNC: 24 U/L (ref 15–37)
BUN BLD-MCNC: 23 MG/DL (ref 7–18)
GLUCOSE SERPLBLD-MCNC: 94 MG/DL (ref 74–106)
HCT VFR BLD CALC: 36 % (ref 36–45)
INR BLD: 1.05
LIPASE SERPL-CCNC: 54 U/L (ref 73–393)
LYMPHOCYTES # SPEC AUTO: 2.5 K/UL (ref 0.7–4.9)
MAGNESIUM SERPL-MCNC: 1.9 MG/DL (ref 1.8–2.4)
NT-PROBNP SERPL-MCNC: 193 PG/ML (ref ?–125)
PMV BLD: 6.9 FL (ref 7.6–11.3)
POTASSIUM SERPL-SCNC: 3.2 MMOL/L (ref 3.5–5.1)
RBC # BLD: 4.1 M/UL (ref 3.86–4.86)
TROPONIN I SERPL HS-MCNC: < 3 PG/ML (ref ?–58.9)

## 2022-05-22 PROCEDURE — 84484 ASSAY OF TROPONIN QUANT: CPT

## 2022-05-22 PROCEDURE — 80076 HEPATIC FUNCTION PANEL: CPT

## 2022-05-22 PROCEDURE — 83690 ASSAY OF LIPASE: CPT

## 2022-05-22 PROCEDURE — 85610 PROTHROMBIN TIME: CPT

## 2022-05-22 PROCEDURE — 99285 EMERGENCY DEPT VISIT HI MDM: CPT

## 2022-05-22 PROCEDURE — 83880 ASSAY OF NATRIURETIC PEPTIDE: CPT

## 2022-05-22 PROCEDURE — 71260 CT THORAX DX C+: CPT

## 2022-05-22 PROCEDURE — 81003 URINALYSIS AUTO W/O SCOPE: CPT

## 2022-05-22 PROCEDURE — 85025 COMPLETE CBC W/AUTO DIFF WBC: CPT

## 2022-05-22 PROCEDURE — 80048 BASIC METABOLIC PNL TOTAL CA: CPT

## 2022-05-22 PROCEDURE — 74177 CT ABD & PELVIS W/CONTRAST: CPT

## 2022-05-22 PROCEDURE — 71045 X-RAY EXAM CHEST 1 VIEW: CPT

## 2022-05-22 PROCEDURE — 36415 COLL VENOUS BLD VENIPUNCTURE: CPT

## 2022-05-22 PROCEDURE — 83735 ASSAY OF MAGNESIUM: CPT

## 2022-05-22 NOTE — XMS REPORT
Continuity of Care Document

                             Created on:May 22, 2022



Patient:JUAN PABLO WU

Sex:Female

:1980

External Reference #:[id number]





Demographics







                          Address                   [address]



                                                    [place] [address]

 

                          Home Phone                [phone]

 

                          Mobile Phone              [phone]

 

                          Email Address             [email or web address]

[email or web address]

 

                          Preferred Language        English

 

                          Marital Status            Unknown

 

                          Worship Affiliation     Unknown

 

                          Race                      Unknown

 

                          Additional Race(s)        Unavailable



                                                    White



                                                    Unavailable



                                                    Other Race

 

                          Ethnic Group              Unknown









Author







                          Organization              [facility]

t

 

                          Address                   [address]



                                                    [place] [address]

 

                          Phone                     [phone]









Support







                Name            Relationship    Address         Phone

 

                Chapincito       Unavailable     [address]    [phone]



                                                [place] [address] 

 

                Chapincito       Unavailable     [address] [phone]



                                                [place] [address] 

 

                CHAPINCITO       X               [address]    [phone]



                                                [place] [address] 

 

                Javier        Spouse          Unavailable     [phone]









Care Team Providers







                    Name                Role                Phone

 

                    PCP,  DOES NOT HAVE A Primary Care Physician Unavailable

 

                    SYSTEM,  NOT IN     Attending Clinician Unavailable

 

                    High              Attending Clinician Unavailable

 

                    YAA NOE        Attending Clinician Unavailable

 

                    Oleksandr SUERO            Attending Clinician [phone]

 

                    YAA Noe MD     Attending Clinician [phone]

 

                    AUTUMN FANG        Attending Clinician Unavailable

 

                    AUTUMN Fang DO     Attending Clinician [phone]

 

                    YAA NOE        Admitting Clinician Unavailable









Problems







       Condition Condition Condition Status Onset  Resolution Last   Treating Co

mments 

Source



       Name   Details Category        Date   Date   Treatment Clinician        



                                                 Date                 

 

       No known No known Disease                                           Unive

rs



       active active                                                  ity of



       problems problems                                                  [facility]







Allergies, Adverse Reactions, Alerts







       Allergy Allergy Status Severity Reaction(s) Onset  Inactive Treating Comm

ents 

Source



       Name   Type                        Date   Date   Clinician        

 

       Prochlor Propensi Active        Unknown - 2020               THROAT Uni

vers



       perazine ty to                See comments 0-20                 [facility] 

ity of



       Edisylat adverse                      00:00:                      [place]



       e      reaction                      00                          Medical



              s                                                       Branch

 

       PROCHLOR DRUG   Active        Unknown-Cmnt 2020                      Un

brian



       PERAZINE INGREDI                      0-20                        ity of



       EDISYLAT                             00:00:                      [place]



       E                                  00                          Medical



                                                                      Branch

 

       SULFA  Drug   Active        Other-Cmnt -                      Univer

s



       (SULFONA Class                       9-23                        ity of



       MIDE                               00:00:                      [place]



       ANTIBIOT                             00                          Medical



       ICS)                                                           Branch

 

       Sulfa  Propensi Active        Other - See                TARGETED U

khai



       (Sulfona ty to                comments                  LESIONS ity o

f



       mide   adverse                      00:00:                      [place]



       Antibiot reaction                      00                          Medica

l



       ics)   s                                                       Branch







Social History







           Social Habit Start Date Stop Date  Quantity   Comments   Source

 

           Exposure to                       Not sure              [facility]

 [place]



           SARS-CoV-2 (event)                                             Medica

l Branch

 

           Cigarettes smoked 2020-10-29 2020-10-29                       [facility]

[facility] [place]



           current (pack per 00:00:00   00:00:00                         Medical

 Branch



           day) - Reported                                             

 

           Tobacco use and 2020-10-29 2020-10-29 Never used            [facility]

[facility] [place]



           exposure   00:00:00   00:00:00                         [facility]

 

           Sex Assigned At 1980                       MD Duarte

on



           Birth      00:00:00   00:00:00                         









                Smoking Status  Start Date      Stop Date       Source

 

                Current every day smoker 2020-10-29 00:00:00                 Uni

[facility] [place] [facility]







Medications







       Ordered Filled Start  Stop   Current Ordering Indication Dosage Frequency

 Signature

                    Comments            Components          Source



     Medication Medication Date Date Medication? Clinician                (SIG) 

          



     Name Name                                                   

 

     HYDROcodone      2022- No             1{tbl}      1 tablet,         

  [facility]



     -acetaminop      2-15 -15                          Oral,           ity of



     hen (NORCO)      22:15: 21:16                          ONCE, 1           Te

[place]



      mg      00   :00                           dose, On           Medica

l



     tablet 1                                         Tue            Branch



     tablet                                         2/15/22 at           



                                                  1615,           



                                                  Routine           

 

     cyclobenzap      2022- No             10mg      10 mg,           Uni

vers



     rine      2-15 -15                          Oral,           ity of



     (FLEXERIL)      22:15: 21:16                          ONCE, 1           Pedro Luis

as



     tablet 10      00   :00                           dose, On           Medica

l



     mg                                           Tue            Branch



                                                  2/15/22 at           



                                                  1615,           



                                                  Routine           

 

     cyclobenzap            Yes       [id number] 10mg      Take 1        

   [facility]



     rine 10 mg      2-15                [id number]           tablet by           it

y of



     tablet      00:00:                               mouth 3           [place]



               00                                 (three)           Medical



                                                  times           Branch



                                                  daily as           



                                                  needed for           



                                                  Muscle           



                                                  Spasms.           

 

     cyclobenzap            Yes       [id number] 10mg      Take 1        

   [facility]



     rine 10 mg      2-15                [id number]           tablet by           it

y of



     tablet      00:00:                               mouth 3           [place]



               00                                 (three)           Medical



                                                  times           Branch



                                                  daily as           



                                                  needed for           



                                                  Muscle           



                                                  Spasms.           

 

     methylPREDN      2-0      Yes       [id number] 84mg      Take 21          

 Univers



     ISolone      2-11                               tablets by           ity of



     (MEDROL,      00:00:                               mouth           [place]



     LIEN,) 4 mg      00                                 SEE-INSTRU           Med

ical



     tablets                                         CTIONS.           Branch



                                                  follow           



                                                  package           



                                                  directions           

 

     methylPREDN      2-0      Yes       [id number] 84mg      Take 21          

 Univers



     ISolone      2-11                               tablets by           ity of



     (MEDROL,      00:00:                               mouth           [place]



     LIEN,) 4 mg      00                                 SEE-INSTRU           Med

ical



     tablets                                         CTIONS.           Branch



                                                  follow           



                                                  package           



                                                  directions           

 

     methylPREDN      2-0      Yes       [id number] 84mg      Take 21          

 Univers



     ISolone      2-11                               tablets by           ity of



     (MEDROL,      00:00:                               mouth           [place]



     LIEN,) 4 mg      00                                 SEE-INSTRU           Med

ical



     tablets                                         CTIONS.           Branch



                                                  follow           



                                                  package           



                                                  directions           

 

     methylPREDN      2-0      Yes       [id number] 84mg      Take 21          

 Univers



     ISolone      2-11                               tablets by           ity of



     (MEDROL,      00:00:                               mouth           [place]



     LIEN,) 4 mg      00                                 SEE-INSTRU           Med

ical



     tablets                                         CTIONS.           Branch



                                                  follow           



                                                  package           



                                                  directions           

 

     methylPREDN      2-0      Yes       [id number] 84mg      Take 21          

 [facility]



     ISolone      2-11                               tablets by           ity of



     (MEDROL,      00:00:                               mouth           [place]



     LIEN,) 4 mg      00                                 SEE-INSTRU           Med

ical



     tablets                                         CTIONS.           Branch



                                                  follow           



                                                  package           



                                                  directions           

 

     lidocaine 5      -0      Yes       [id number]           Apply           U

nivers



     % (700      1-10                               patch to           ity of



     mg/patch)      00:00:                               affected           [place]

[facility]



     patch      00                                 area and           Medical



                                                  leave for           Branch



                                                  12 hours           



                                                  before           



                                                  removing.           



                                                  Use one           



                                                  patch           



                                                  every 24           



                                                  hours. May           



                                                  cut to           



                                                  size.           

 

     methocarbam      -0      Yes       [id number] 500mg      Take 1          

 [facility]



     oL 500 mg      1-10                               tablet by           ity o

f



     tablet      00:00:                               mouth 4           [place]



               00                                 (four)           Medical



                                                  times           Branch



                                                  daily as           



                                                  needed for           



                                                  Other           



                                                  (muscle           



                                                  spasm).           

 

     naproxen      2-0      Yes       [id number] 500mg      Take 1           Un

brian



     (NAPROSYN)      1-10                               tablet by           ity 

of



     500 mg      00:00:                               mouth 2           [place]



     tablet      00                                 (two)           Medical



                                                  times           Branch



                                                  daily with           



                                                  meals.           

 

     lidocaine 5      -0      Yes       [id number]           Apply           U

nivers



     % (700      1-10                               patch to           ity of



     mg/patch)      00:00:                               affected           [place]

[facility]



     patch      00                                 area and           Medical



                                                  leave for           Branch



                                                  12 hours           



                                                  before           



                                                  removing.           



                                                  Use one           



                                                  patch           



                                                  every 24           



                                                  hours. May           



                                                  cut to           



                                                  size.           

 

     methocarbam      2-0      Yes       [id number] 500mg      Take 1          

 [facility]



     oL 500 mg      1-10                               tablet by           ity o

f



     tablet      00:00:                               mouth 4           [place]



                                                (four)           Medical



                                                  times           Branch



                                                  daily as           



                                                  needed for           



                                                  Other           



                                                  (muscle           



                                                  spasm).           

 

     naproxen      2-0      Yes       [id number] 500mg      Take 1           Un

brian



     (NAPROSYN)      1-10                               tablet by           ity 

of



     500 mg      00:00:                               mouth 2           [place]



     tablet      00                                 (two)           Medical



                                                  times           Branch



                                                  daily with           



                                                  meals.           

 

     lidocaine 5      -0      Yes       [id number]           Apply           U

nivers



     % (700      1-10                               patch to           ity of



     mg/patch)      00:00:                               affected           [place]

[facility]



     patch      00                                 area and           Medical



                                                  leave for           Branch



                                                  12 hours           



                                                  before           



                                                  removing.           



                                                  Use one           



                                                  patch           



                                                  every 24           



                                                  hours. May           



                                                  cut to           



                                                  size.           

 

     methocarbam      2-0      Yes       [id number] 500mg      Take 1          

 [facility]



     oL 500 mg      1-10                               tablet by           ity o

f



     tablet      00:00:                               mouth 4           [place]



               00                                 (four)           Medical



                                                  times           Branch



                                                  daily as           



                                                  needed for           



                                                  Other           



                                                  (muscle           



                                                  spasm).           

 

     naproxen      2-0      Yes       [id number] 500mg      Take 1           Un

brian



     (NAPROSYN)      1-10                               tablet by           ity 

of



     500 mg      00:00:                               mouth 2           [place]



     tablet      00                                 (two)           Medical



                                                  times           Branch



                                                  daily with           



                                                  meals.           

 

     lidocaine 5      -0      Yes       [id number]           Apply           U

nivers



     % (700      1-10                               patch to           ity of



     mg/patch)      00:00:                               affected           [place]

[facility]



     patch      00                                 area and           Medical



                                                  leave for           Branch



                                                  12 hours           



                                                  before           



                                                  removing.           



                                                  Use one           



                                                  patch           



                                                  every 24           



                                                  hours. May           



                                                  cut to           



                                                  size.           

 

     methocarbam      2-0      Yes       [id number] 500mg      Take 1          

 [facility]



     oL 500 mg      1-10                               tablet by           ity o

f



     tablet      00:00:                               mouth 4           [place]



               00                                 (four)           Medical



                                                  times           Branch



                                                  daily as           



                                                  needed for           



                                                  Other           



                                                  (muscle           



                                                  spasm).           

 

     naproxen      2022-0      Yes       [id number] 500mg      Take 1           Un

brian



     (NAPROSYN)      1-10                               tablet by           ity 

of



     500 mg      00:00:                               mouth 2           [place]



     tablet      00                                 (two)           Medical



                                                  times           Branch



                                                  daily with           



                                                  meals.           

 

     lidocaine 5      2-0      Yes       [id number]           Apply           U

nivers



     % (700      1-10                               patch to           ity of



     mg/patch)      00:00:                               affected           [place]

[facility]



     patch      00                                 area and           Medical



                                                  leave for           Branch



                                                  12 hours           



                                                  before           



                                                  removing.           



                                                  Use one           



                                                  patch           



                                                  every 24           



                                                  hours. May           



                                                  cut to           



                                                  size.           

 

     methocarbam            Yes       [id number] 500mg      Take 1          

 [facility]



     oL 500 mg      1-10                               tablet by           ity o

f



     tablet      00:00:                               mouth 4           [place]



               00                                 (four)           Medical



                                                  times           Branch



                                                  daily as           



                                                  needed for           



                                                  Other           



                                                  (muscle           



                                                  spasm).           

 

     naproxen            Yes       [id number] 500mg      Take 1           Un

brian



     (NAPROSYN)      1-10                               tablet by           ity 

of



     500 mg      00:00:                               mouth 2           [place]



     tablet      00                                 (two)           Medical



                                                  times           Branch



                                                  daily with           



                                                  meals.           

 

     acetaminoph      2020      Yes       4647 1{tbl}      Take 1           Un

brian



     en-codeine      0-20                               tablet by           ity 

of



     (TYLENOL-CO      00:00:                               mouth           [place]



     DEINE #3)      00                                 every 4           Medical



     300-30 mg                                         (four)           Branch



     tablet                                         hours as           



                                                  needed for           



                                                  Pain           



                                                  (scale           



                                                  1-3).           



                                                  Indication           



                                                  s: acute           



                                                  pain           

 

     acetaminoph      2020      Yes       4647 1{tbl}      Take 1           Un

brian



     en-codeine      0-20                               tablet by           ity 

of



     (TYLENOL-CO      00:00:                               mouth           [place]



     DEINE #3)      00                                 every 4           Medical



     300-30 mg                                         (four)           Branch



     tablet                                         hours as           



                                                  needed for           



                                                  Pain           



                                                  (scale           



                                                  1-3).           



                                                  Indication           



                                                  s: acute           



                                                  pain           

 

     acetaminoph      2020      Yes       4647 1{tbl}      Take 1           Un

brian



     en-codeine      0-20                               tablet by           ity 

of



     (TYLENOL-CO      00:00:                               mouth           [place]



     DEINE #3)      00                                 every 4           Medical



     300-30 mg                                         (four)           Branch



     tablet                                         hours as           



                                                  needed for           



                                                  Pain           



                                                  (scale           



                                                  1-3).           



                                                  Indication           



                                                  s: acute           



                                                  pain           

 

     acetaminoph      1      Yes       4647 1{tbl}      Take 1           Un

brian



     en-codeine      0-20                               tablet by           ity 

of



     (TYLENOL-CO      00:00:                               mouth           [place]



     DEINE #3)      00                                 every 4           Medical



     300-30 mg                                         (four)           Branch



     tablet                                         hours as           



                                                  needed for           



                                                  Pain           



                                                  (scale           



                                                  1-3).           



                                                  Indication           



                                                  s: acute           



                                                  pain           

 

     acetaminoph      2020      Yes       4647 1{tbl}      Take 1           Un

brian



     en-codeine      0-20                               tablet by           ity 

of



     (TYLENOL-CO      00:00:                               mouth           [place]



     DEINE #3)      00                                 every 4           Medical



     300-30 mg                                         (four)           Branch



     tablet                                         hours as           



                                                  needed for           



                                                  Pain           



                                                  (scale           



                                                  1-3).           



                                                  Indication           



                                                  s: acute           



                                                  pain           







Vital Signs







             Vital Name   Observation Time Observation Value Comments     Source

 

             Systolic blood 2022-02-15 21:18:46 145 mm[Hg]                [facility]iver

sity of



             pressure                                            [facility]



                                                                 Branch

 

             Diastolic blood 2022-02-15 21:18:46 83 mm[Hg]                 Unive

rsity of



             pressure                                            [facility]



                                                                 Branch

 

             Heart rate   2022-02-15 21:18:46 80 /min                   [facility]

ty of



                                                                 [facility]



                                                                 Branch

 

             Body temperature 2022-02-15 21:18:46 36.89 Isamar                 Univ

ersity of



                                                                 [facility]



                                                                 Branch

 

             Respiratory rate 2022-02-15 21:18:46 16 /min                   Univ

ersity of



                                                                 [facility]



                                                                 Branch

 

             Body height  2022-02-15 20:27:00 165.1 cm                  Universi

ty of



                                                                 [facility]



                                                                 Branch

 

             Body weight  2022-02-15 20:27:00 61.236 kg                 Universi

ty of



                                                                 [facility]



                                                                 Branch

 

             BMI          2022-02-15 20:27:00 22.47 kg/m2               Universi

ty of



                                                                 [facility]



                                                                 Branch

 

             Oxygen saturation in 2022-02-15 20:27:00 100 /min                  

[facility]



             Arterial blood by                                        [place] [facility]

tosha



             Pulse oximetry                                        Branch

 

             Systolic blood 2022 16:23:00 125 mm[Hg]                Univer

sity of



             pressure                                            [facility]



                                                                 Branch

 

             Diastolic blood 2022 16:23:00 82 mm[Hg]                 Unive

rsity of



             pressure                                            [facility]



                                                                 Branch

 

             Heart rate   2022 16:23:00 104 /min                  Universi

ty of



                                                                 [facility]



                                                                 Branch

 

             Body height  2022 16:23:00 165.1 cm                  Universi

ty of



                                                                 [facility]



                                                                 Branch

 

             Body weight  2022 16:23:00 61.009 kg                 Universi

ty of



                                                                 [facility]



                                                                 Branch

 

             BMI          2022 16:23:00 22.38 kg/m2               Universi

ty of



                                                                 [facility]



                                                                 Branch

 

             Oxygen saturation in 2022 16:23:00 100 /min                  

[facility]



             Arterial blood by                                        [place] [facility]

tos[facility]



             Pulse oximetry                                        Branch







Procedures







                Procedure       Date / Time Performed Performing Clinician [facility]

e

 

                CONSENT/REFUSAL FOR 2022-02-15 20:32:15 Doctor Unassigned, No Un

[facility]



                DIAGNOSIS AND                   Name            Medical Branch



                TREATMENT                                       







Encounters







        Start   End     Encounter Admission Attending Care    Care    Encounter 

Source



        Date/Time Date/Time Type    Type    Clinicians Facility Department ID   

   

 

        2022         Outpatient         SYSTEM, [facility]     [id number]

 MD



        15:19:22                         PROVIDER                         Gerald hogan

 

        2022         Outpatient         High, [facility]  [facility]  [id number]

 Common



        07:58:00                         [facility]                     [facility]

 

        2022         Outpatient         [facility], [facility]  [facility]  [id number]

 Common



        13:25:03                         Saloni                     [facility]

 

        2022         Outpatient         High, [facility]  [facility]  [id number]

 Common



        14:46:02                         Saloni                     [facility]

 

        2022         Outpatient                 [facility]  [facility]  [id number]

 Common



        13:54:03                                                    [facility]

 

        2022         Outpatient SARAHI NOE [facility]    SCOO[facility]     [id number]

[id number] Univers



        13:42:21                         MALU                           [facility]

 

        2022 ambulatory                 [facility]  [facility]  [id number]

 Common



        00:00:00 00:00:00                                                 [facility]

 

        2022 ambulatory                 [facility]  [facility]  [id number]

 Common



        00:00:00 00:00:00                                                 [facility]

 

        2022 ambulatory                 [facility]  [facility]  [id number]

 Common



        00:00:00 00:00:00                                                 [facility]

 

        2022 Telephone         [facility]    [id number]

[id number] Univers



        00:00:00 00:00:00                 [facility]  [id number]         it

y of



                                                JUAN [id number]         Pedro Luis

as



                                                TED?BLEA [phone]         [address]



                                                MEDICAL                 



                                                OFFICE                  



                                                BUILDING                 

 

        2022-02-15 2022-02-15 Emergency X       LEONCIO [facility]    ERT     [id number]

[id number] Univers



        14:33:00 15:22:00                 ЕЛЕНА chandler [facility]

 

        2022-02-15 2022-02-15 Emergency         Leoncio [facility]    [id number]

[id number] Univers



        14:33:00 15:22:00                 Елена MARTINEZ [id number]         

ity of



                                                [facility] [id number]         [place]

[place]



                                                Chaplin  [phone]         Medi

tosha



                                                        [address]             [place]

 

        2022-02-15 2022-02-15 Telephone         [facility]    [id number]

[id number] Univers



        00:00:00 00:00:00                 Malu MON [facility]  [id number]         it

y of



                                                JUAN [id number]         Pedro Luis

as



                                                TED?BLEA [phone]         Me

sagrario MAC    [address] [place] [address]



                                                MEDICAL                 



                                                OFFICE                  



                                                [facility]                 

 

        2022 Office          [facility]    [id number]

[id number] Univers



        10:30:00 11:04:22 Visit           Malu MON [facility]  [id number]         it

y of



                                                JUAN [id number]         Pedro Luis

as



                                                TED?BLEA [phone]         Me

sagrario [address]                 



                                                OFFICE                  



                                                [facility]                 

 

        2022 Telephone         [facility]    [id number]

[id number] Univers



        00:00:00 00:00:00                 Malu MON [employer]  [id number]         it

y of



                                                JUAN [id number]         Pedro Luis

as



                                                TED?BLEA [phone]         Me

sagrario MAC    [address] [place] [address]                 



                                                OFFICE                  



                                                [facility]                 







Results

This patient has no known results.

## 2022-05-22 NOTE — EDPHYS
Physician Documentation                                                                           

 [facility]                                                                 

Name: Karo Beckham                                                                          

Age: 42 yrs                                                                                       

Sex: Female                                                                                       

: 1980                                                                                   

MRN: [id number]                                                                                   

Arrival Date: 2022                                                                          

Time: 03:41                                                                                       

Account#: [id number]                                                                            

Bed 17                                                                                            

Private MD:                                                                                       

ED Physician Mine Angulo                                                                    

HPI:                                                                                              

                                                                                             

05:34 This 42 yrs old  Female presents to ER via Ambulatory with complaints of Back  cynthia 

      Pain.                                                                                       

05:34 The patient presents with pain that is acute, that is chronic, with no known mechanism  cynthia 

      of injury. The symptoms are located in the low back, left low back and left mid back.       

      Onset: The symptoms/episode began/occurred 3 day(s) ago. The pain does not radiate.         

      Associated signs and symptoms: Pertinent positives: nausea. The problem was sustained       

      from unknown cause, SPINE INJECTIONS LAST WEEL. Modifying factors: The patient symptoms     

      are alleviated by nothing, the patient symptoms are aggravated by any movement.             

      Severity of symptoms: At their worst the symptoms were moderate, severe, in the             

      emergency department the symptoms are unchanged. The patient has not experienced            

      similar symptoms in the past.                                                               

                                                                                                  

Historical:                                                                                       

- Allergies:                                                                                      

03:56 Compazine;                                                                              tw5 

03:56 Sulfa (Sulfonamide Antibiotics);                                                        tw5 

- PMHx:                                                                                           

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 

                                                                                                  

- Immunization history:: Flu vaccine is not up to date. Patient has never been                    

  vaccinated.                                                                                     

- Social history:: Smoking status: Patient/guardian denies using tobacco, Stopped _               

  months ago 1.                                                                                   

- Family history:: not pertinent.                                                                 

                                                                                                  

                                                                                                  

ROS:                                                                                              

05:34 Constitutional: Negative for fever, chills, and weight loss, Eyes: Negative for injury, cynthia 

      pain, redness, and discharge, ENT: Negative for injury, pain, and discharge, Neck:          

      Negative for injury, pain, and swelling, Cardiovascular: Negative for chest pain,           

      palpitations, and edema, Respiratory: Negative for shortness of breath, cough,              

      wheezing, and pleuritic chest pain, Abdomen/GI: Negative for abdominal pain, nausea,        

      vomiting, diarrhea, and constipation, : Negative for injury, bleeding, discharge, and     

      swelling, MS/Extremity: Negative for injury and deformity, Skin: Negative for injury,       

      rash, and discoloration, Neuro: Negative for headache, weakness, numbness, tingling,        

      and seizure, Psych: Negative for depression, anxiety, suicide ideation, homicidal           

      ideation, and hallucinations, Allergy/Immunology: Negative for hives, rash, and             

      allergies, Endocrine: Negative for neck swelling, polydipsia, polyuria, polyphagia, and     

      marked weight changes, Hematologic/Lymphatic: Negative for swollen nodes, abnormal          

      bleeding, and unusual bruising.                                                             

05:34 Back: Positive for decreased range of motion, pain at rest, pain with movement, of the      

      left low back and left mid back.                                                            

                                                                                                  

Exam:                                                                                             

05:34 Constitutional:  This is a well developed, well nourished patient who is awake, alert,  cynthia 

      and in no acute distress. Head/Face:  Normocephalic, atraumatic. Eyes:  Pupils equal        

      round and reactive to light, extra-ocular motions intact.  Lids and lashes normal.          

      Conjunctiva and sclera are non-icteric and not injected.  Cornea within normal limits.      

      Periorbital areas with no swelling, redness, or edema. ENT:  Nares patent. No nasal         

      discharge, no septal abnormalities noted.  Tympanic membranes are normal and external       

      auditory canals are clear.  Oropharynx with no redness, swelling, or masses, exudates,      

      or evidence of obstruction, uvula midline.  Mucous membranes moist. Neck:  Trachea          

      midline, no thyromegaly or masses palpated, and no cervical lymphadenopathy.  Supple,       

      full range of motion without nuchal rigidity, or vertebral point tenderness.  No            

      Meningismus. Chest/axilla:  Normal chest wall appearance and motion.  Nontender with no     

      deformity.  No lesions are appreciated. Cardiovascular:  Regular rate and rhythm with a     

      normal S1 and S2.  No gallops, murmurs, or rubs.  Normal PMI, no JVD.  No pulse             

      deficits. Abdomen/GI:  Soft, non-tender, with normal bowel sounds.  No distension or        

      tympany.  No guarding or rebound.  No evidence of tenderness throughout. Skin:  Warm,       

      dry with normal turgor.  Normal color with no rashes, no lesions, and no evidence of        

      cellulitis. MS/ Extremity:  Pulses equal, no cyanosis.  Neurovascular intact.  Full,        

      normal range of motion. Neuro:  Awake and alert, GCS 15, oriented to person, place,         

      time, and situation.  Cranial nerves II-XII grossly intact.  Motor strength 5/5 in all      

      extremities.  Sensory grossly intact.  Cerebellar exam normal.  Normal gait. Psych:         

      Awake, alert, with orientation to person, place and time.  Behavior, mood, and affect       

      are within normal limits.                                                                   

05:34 Respiratory: mild respiratory distress is noted, moderate respiratory distress is           

      noted,  Respirations: normal, Breath sounds: bronchial sounds, that are mild, are           

      scattered, decreased breath sounds, that are mild, are scattered, rhonchi, that are         

      mild, are scattered, stridor, is not appreciated, + upper airway congestion.                

      Respiratory rate:  87                                                                       

                                                                                                  

Vital Signs:                                                                                      

03:55  / 89; Pulse 86; Resp 18; Temp 98(TE); Pulse Ox 100% on R/A; Weight 56.7 kg;      tw5 

      Height 5 ft. 5 in. (165.10 cm); Pain 10/10;                                                 

04:48  / 80; Pulse 87; Resp 16; Temp 97.8; Pulse Ox 98% ; Pain 10/10;                   tw5 

05:57  / 82; Pulse 74; Resp 20; Temp 98.4; Pulse Ox 98% ; Pain 8/10;                    mw  

06:15  / 86; Pulse 71; Temp 97.5; Pulse Ox 100% ; Pain 7/10;                            mw  

07:30  / 81; Pulse 73; Resp 18; Pulse Ox 100% on R/A;                                   ph  

08:00  / 79; Pulse 76; Resp 16; Pulse Ox 98% on R/A;                                    ph  

08:30  / 85; Pulse 71; Resp 16; Pulse Ox 99% on R/A;                                    ph  

09:15  / 84; Pulse 95; Resp 18; Pulse Ox 99% on R/A;                                    ph  

10:08  / 102; Pulse 100; Resp 18; Temp 97.8; Pulse Ox 98% on R/A;                       ph  

10:30  / 66; Pulse 79; Resp 18; Temp 97.4; Pulse Ox 98% on R/A;                         ph  

03:55 Body Mass Index 20.80 (56.70 kg, 165.10 cm)                                             tw5 

                                                                                                  

MDM:                                                                                              

03:57 Patient medically screened.                                                             cynthia 

05:37 Differential diagnosis: arthritis, chronic back pain, Fracture Neoplasm Osteoarthritis  cynthia 

      Pyelonephritis ruptured disc, Scoliosis sprain. Data reviewed: vital signs, nurses          

      notes, lab test result(s), EKG, radiologic studies, plain films. Data interpreted:          

      Cardiac monitor: rate is 87 beats/min, rhythm is regular, Pulse oximetry: on room air       

      is 98 %. Test interpretation: by ED physician or midlevel provider: ECG, plain              

      radiologic studies. Counseling: I had a detailed discussion with the patient and/or         

      guardian regarding: the historical points, exam findings, and any diagnostic results        

      supporting the discharge/admit diagnosis, lab results, radiology results.                   

09:36 ED course: ACCEPTED BY DR. MUFTI HOANG.                                                  ma2 

                                                                                                  

                                                                                             

04:22 Order name: Basic Metabolic Panel; Complete Time: 06:42                                 [facility] 

                                                                                             

04:22 Order name: CBC with Diff; Complete Time: 05:32                                         cynthia 

                                                                                             

04:22 Order name: LFT's; Complete Time: 06:42                                                 [facility] 

                                                                                             

04:22 Order name: Magnesium; Complete Time: 06:42                                             [facility] 

                                                                                             

04:22 Order name: NT PRO-BNP; Complete Time: 06:42                                            [facility] 

                                                                                             

04:22 Order name: PT-INR; Complete Time: 05:32                                                [facility] 

                                                                                             

04:22 Order name: Troponin HS; Complete Time: 06:42                                           [facility] 

                                                                                             

04:22 Order name: XRAY Chest (1 view)                                                                                                                                                      

04:22 Order name: Lipase; Complete Time: 06:42                                                [facility] 

                                                                                             

04:22 Order name: CT Chest, Abdomen, Pelvis - W/Contrast; Complete Time: 07:49                [facility] 

                                                                                             

04:22 Order name: SARS-COV-2 RT PCR (Document "Date of Onset" if Symptomatic); Complete Time: cynthia 

      06:42                                                                                       

                                                                                             

05:24 Order name: Urine Dipstick-Ancillary; Complete Time: 05:32                              [facility]MS

                                                                                             

04:22 Order name: Cardiac monitoring; Complete Time: 07:37                                    [facility] 

                                                                                             

04:22 Order name: IV Saline Lock; Complete Time: 07:37                                        [facility] 

                                                                                             

04:22 Order name: Labs collected and sent; Complete Time: 07:37                               [facility] 

                                                                                             

04:22 Order name: O2 Per Protocol; Complete Time: 07:37                                       [facility] 

                                                                                             

04:22 Order name: O2 Sat Monitoring; Complete Time: 07:37                                     [facility] 

                                                                                             

04:22 Order name: Urine Dipstick-Ancillary (obtain specimen); Complete Time: 05:13            [facility] 

                                                                                             

04:22 Order name: Urine Pregnancy Test (obtain specimen); Complete Time: 05:13                [facility] 

                                                                                                  

Administered Medications:                                                                         

05:11 Drug: Pepcid (famotidine) 20 mg Route: IVP; Rate: bolus; Site: right antecubital;       mw  

06:27 Follow up: Response: No adverse reaction                                                mw  

05:12 Drug: Dilaudid (HYDROmorphone) 1 mg Route: IVP; Site: right antecubital;                mw  

06:02 Follow up: Response: Pain is decreased                                                  mw  

05:12 Drug: Zofran (Ondansetron) 4 mg Route: IVP; Site: right antecubital;                    mw  

06:27 Follow up: Response: Nausea is decreased                                                mw  

05:12 Drug: NS 0.9% 1000 ml Route: IV; Rate: 1 bolus; Site: right antecubital;                mw  

06:02 Drug: Dilaudid (HYDROmorphone) 1 mg Route: IVP; Site: Other;                            mw  

06:26 Follow up: Response: Pain is decreased                                                  mw  

07:32 Drug: Dilaudid (HYDROmorphone) 1 mg Route: IVP; Site: right antecubital;                ph  

08:00 Follow up: Response: No adverse reaction; Pain is decreased; RASS: Restless (+1)        ph  

07:35 Drug: Zofran (Ondansetron) 4 mg Route: IVP; Site: right antecubital;                    ph  

08:00 Follow up: Response: No adverse reaction                                                ph  

07:58 Not Given (Duplicate Order): NS 0.9% 1000 ml IV at 125 ml/hr continuous                 cynthia 

08:40 Drug: D5-1/2 NS with KCl 20 mEq/L 1000 ml Route: IV; Rate: 125 ml/hr; Site: right       ph  

      antecubital;                                                                                

10:45 Follow up: Response: No adverse reaction; IV Status: Infusion continued upon transfer   ph  

08:50 Drug: Dilaudid (HYDROmorphone) 1 mg Route: IVP; Site: right antecubital;                ph  

09:10 Follow up: Response: No adverse reaction; RASS: Restless (+1)                           ph  

09:45 Drug: Phenergan (promethazine) 12.5 mg Route: IVP; Site: right antecubital;             ph  

10:15 Follow up: Response: No adverse reaction; Nausea is decreased                           ph  

10:15 Drug: Dilaudid (HYDROmorphone) 1 mg Route: IVP; Site: right antecubital;                ph  

10:30 Follow up: Response: No adverse reaction; RASS: Restless (+1)                           ph  

                                                                                                  

                                                                                                  

Disposition Summary:                                                                              

22 07:57                                                                                    

Transfer Ordered                                                                                  

      Transfer Location: [facility]                                cynthia 

      Reason: Higher level of care                                                            cynthia 

      Condition: Fair                                                                         cynthia 

      Problem: new                                                                            cynthia 

      Symptoms: have improved                                                                 cynthia 

      Accepting Physician: TO [facility](22 10:54)                                        ph  

      Diagnosis                                                                                   

        - Other diseases of mediastinum, not elsewhere classified - BULKY MALIGANT MASS       cynthia 

      MEDIASTINUM, ADDOMINAL LYMPHAENOPATHY                                                       

        - Hypokalemia                                                                         cynthia 

        - Elevated white blood cell count                                                     cynthia 

      Forms:                                                                                      

        - Medication Reconciliation Form                                                      cynthia 

        - SBAR form                                                                           cynthia 

Signatures:                                                                                       

Dispatcher [employer]                           EDMS                                                 

Lucrecia Mohr RN RN                                                      

Renato Eisenberg MD MD cha Hall, Patricia, RN RN                                                      

Mine Angulo MD MD   ma2                                                  

Cassy Helms                                tw5                                                  

Aliya Arauz PA                       PA   [id number]                                                  

                                                                                                  

Corrections: (The following items were deleted from the chart)                                    

07:57 07:57 TO [facility] cynthia                                                                   cynthia 

10:54 07:57 TO [facility]                                                                   ph  

                                                                                                  

**************************************************************************************************

## 2022-05-22 NOTE — ER
Nurse's Notes                                                                                     

 [facility]                                                                 

Name: Karo Beckham                                                                          

Age: 42 yrs                                                                                       

Sex: Female                                                                                       

: 1980                                                                                   

MRN: [id number]                                                                                   

Arrival Date: 2022                                                                          

Time: 03:41                                                                                       

Account#: [id number]                                                                            

Bed 17                                                                                            

Private MD:                                                                                       

Diagnosis: Other diseases of mediastinum, not elsewhere classified-BULKY MALIGANT MASS            

  MEDIASTINUM, ADDOMINAL LYMPHAENOPATHY;Hypokalemia;Elevated white blood cell count               

                                                                                                  

Presentation:                                                                                     

                                                                                             

03:55 Chief complaint: Patient states: "I have been getting the run around. I got an          tw5 

      injection in my back that was suppose to buy me some time before I got seen by the          

      oncologist, it has only made the pain in my back worse. This is terrible.". Coronavirus     

      screen: Vaccine status: Patient reports being unvaccinated. Ebola Screen: Patient           

      negative for fever greater than or equal to 101.5 degrees Fahrenheit, and additional        

      compatible Ebola Virus Disease symptoms Patient denies exposure to infectious person.       

      Patient denies travel to an Ebola-affected area in the 21 days before illness onset.        

      Initial Sepsis Screen: Does the patient meet any 2 criteria? No. Patient's initial          

      sepsis screen is negative. Does the patient have a suspected source of infection? No.       

      Patient's initial sepsis screen is negative. Risk Assessment: Do you want to hurt           

      yourself or someone else? Patient reports no desire to harm self or others. Onset of        

      symptoms was May 20, .                                                                  

03:55 Method Of Arrival: Ambulatory                                                           tw5 

03:55 Acuity: GENARO 3                                                                           tw5 

                                                                                                  

Triage Assessment:                                                                                

03:56 General: Appears uncomfortable, Behavior is agitated, anxious, restless. Pain:          tw5 

      Complains of pain in "My entire left side of my body hurts." Pain currently is 10 out       

      of 10 on a pain scale. Musculoskeletal: Range of motion: intact in all extremities.         

                                                                                                  

Historical:                                                                                       

- Allergies:                                                                                      

03:56 Compazine;                                                                              tw5 

03:56 Sulfa (Sulfonamide Antibiotics);                                                        tw5 

- PMHx:                                                                                           

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 

                                                                                                  

- Immunization history:: Flu vaccine is not up to date. Patient has never been                    

  vaccinated.                                                                                     

- Social history:: Smoking status: Patient/guardian denies using tobacco, Stopped _               

  months ago 1.                                                                                   

- Family history:: not pertinent.                                                                 

                                                                                                  

                                                                                                  

Screenin:30 Abuse screen: Denies threats or abuse. Denies injuries from another. Nutritional        ph  

      screening: No deficits noted. Tuberculosis screening: No symptoms or risk factors           

      identified. Fall Risk None identified.                                                      

                                                                                                  

Assessment:                                                                                       

04:00 General: Appears distressed, uncomfortable, Behavior is anxious, restless, Reports.     mw  

      Pain: Complains of pain in back Pain currently is 10 out of 10 on a pain scale. Quality     

      of pain is described as sharp, shooting, Pain began 2-3 days ago. Is continuous,            

      Alleviated by medications, repositioning, Aggravated by repositioning, lying flat on        

      back Noted to be grimacing, guarding, moaning, restless. Neuro: No deficits noted.          

      Cardiovascular:. Cardiovascular: No deficits noted. Respiratory: Airway is patent           

      Respiratory effort is even, Respiratory pattern is regular, symmetrical. Respiratory:       

      Reports history of lung cancer. GI: No deficits noted. GI: No signs and/or symptoms         

      were reported involving the gastrointestinal system. Pt is actively vomiting bile. :      

      No deficits noted. EENT: No deficits noted. Derm: No deficits noted. Musculoskeletal:       

      No deficits noted.                                                                          

07:30 General: Appears in no apparent distress. uncomfortable, Behavior is cooperative,       ph  

      appropriate for age, crying, restless, Denies fever. Pain: Complains of pain in lumbar      

      area, left low back and right low back Pain radiates to left gluteus willie Quality of     

      pain is described as pressure, sharp, Noted to be grimacing, restless. Neuro: Toscano      

      Agitation-Sedation Scale (RASS): +1 Restless Level of Consciousness is awake, alert,        

      obeys commands, Oriented to person, place, time, situation. Cardiovascular: Capillary       

      refill < 3 seconds in bilateral fingers Patient's skin is warm and dry. Respiratory:        

      Airway is patent Respiratory effort is even, unlabored, Respiratory pattern is regular,     

      symmetrical. GI: Reports nausea. Derm: Skin is healthy with good turgor, Skin is pink,      

      warm \T\ dry. Musculoskeletal: Circulation, motion, and sensation intact. Range of          

      motion: intact in all extremities.                                                          

08:30 Reassessment: Patient appears in no apparent distress at this time. Patient and/or      ph  

      family updated on plan of care and expected duration. Pain level reassessed. Patient is     

      alert, oriented x 3, equal unlabored respirations, skin warm/dry/pink. Pt still c/o         

      pain, states, " The pain meds help for a few minutes but the pain just comes back, it       

      feels like there is so much pressure." ERP notified and verbal order received for           

      additional dilaudid 1mg IVP ,see MAR, VSS w/ BP 140s systolic, SPO2 also maintained 98%     

      or greater on RA, RASS score remains +1.                                                    

09:15 Reassessment: Patient appears in no apparent distress at this time. Patient and/or      ph  

      family updated on plan of care and expected duration. Pain level reassessed. Patient is     

      alert, oriented x 3, equal unlabored respirations, skin warm/dry/pink. Reassessment: Dr Angulo at bedside to speak w/ pt, awaiting acceptance for transfer to another              

      facility. Neuro: Toscano Agitation-Sedation Scale (RASS): +1 Restless Level of             

      Consciousness is awake, alert, obeys commands, Oriented to person, place, time,             

      situation.                                                                                  

10:30 Reassessment: report given to Emily PARADA at [facility], awaiting EMS for transport.    ph  

                                                                                                  

Vital Signs:                                                                                      

03:55  / 89; Pulse 86; Resp 18; Temp 98(TE); Pulse Ox 100% on R/A; Weight 56.7 kg;      tw5 

      Height 5 ft. 5 in. (165.10 cm); Pain 10/10;                                                 

04:48  / 80; Pulse 87; Resp 16; Temp 97.8; Pulse Ox 98% ; Pain 10/10;                   tw5 

05:57  / 82; Pulse 74; Resp 20; Temp 98.4; Pulse Ox 98% ; Pain 8/10;                    mw  

06:15  / 86; Pulse 71; Temp 97.5; Pulse Ox 100% ; Pain 7/10;                            mw  

07:30  / 81; Pulse 73; Resp 18; Pulse Ox 100% on R/A;                                   ph  

08:00  / 79; Pulse 76; Resp 16; Pulse Ox 98% on R/A;                                    ph  

08:30  / 85; Pulse 71; Resp 16; Pulse Ox 99% on R/A;                                    ph  

09:15  / 84; Pulse 95; Resp 18; Pulse Ox 99% on R/A;                                    ph  

10:08  / 102; Pulse 100; Resp 18; Temp 97.8; Pulse Ox 98% on R/A;                       ph  

10:30  / 66; Pulse 79; Resp 18; Temp 97.4; Pulse Ox 98% on R/A;                         ph  

03:55 Body Mass Index 20.80 (56.70 kg, 165.10 cm)                                             tw5 

                                                                                                  

ED Course:                                                                                        

03:41 Patient arrived in ED.                                                                  kz  

03:43 Renato Eisenberg MD is Attending Physician.                                             cynthia 

03:56 Triage completed.                                                                       tw5 

03:56 Arm band placed on right wrist. Patient placed in an exam room, on a stretcher. EKG     tw5 

      completed in triage. Results shown to MD.                                                   

04:50 Inserted saline lock: 20 gauge in right antecubital area, using aseptic technique.      mw  

04:55 Cassy Helms is Primary Nurse.                                                         tw5 

05:00 XRAY Chest (1 view) In Process Unspecified.                                             EDMS

06:10 Patient has correct armband on for positive identification. Bed in low position. Call   mw  

      light in reach. Adult w/ patient.                                                           

06:11 Head of bed elevated. Diet: Patient is NPO. Diet: Patient is NPO. Diet: Patient is NPO. mw  

06:12 Inserted.                                                                               mw  

06:25 patient to [address] scan.                                                                     mw  

06:48 CT Chest, Abdomen, Pelvis - W/Contrast In Process Unspecified.                          EDMS

07:57 Transfer initiated with [facility]; transfer declined due to       em1 

      capacity.                                                                                   

08:33 Attending Physician role handed off by Renato Eisenberg MD                              ma2 

08:33 Mine Angulo MD is Attending Physician.                                           ma2 

08:35 Transfer initiated with [facility]; transfer declined due to capacity.        em1 

09:16 Primary Nurse role handed off by Cassy Helms                                          ph  

09:16 Rebeca Li, RN is Primary Nurse.                                                    ph  

09:29 Transfer initiated with [facility].                                                           em1 

09:42 Doc to doc report given; administrative approval given by Brittany Heck for transfer to   [address] to Dr. ADONIS Pierre.                                                             

10:50 No provider procedures requiring assistance completed. Patient transferred, IV remains  ph  

      in place.                                                                                   

                                                                                                  

Administered Medications:                                                                         

05:11 Drug: Pepcid (famotidine) 20 mg Route: IVP; Rate: bolus; Site: right antecubital;       mw  

06:27 Follow up: Response: No adverse reaction                                                mw  

05:12 Drug: Dilaudid (HYDROmorphone) 1 mg Route: IVP; Site: right antecubital;                mw  

06:02 Follow up: Response: Pain is decreased                                                  mw  

05:12 Drug: Zofran (Ondansetron) 4 mg Route: IVP; Site: right antecubital;                    mw  

06:27 Follow up: Response: Nausea is decreased                                                mw  

05:12 Drug: NS 0.9% 1000 ml Route: IV; Rate: 1 bolus; Site: right antecubital;                mw  

06:02 Drug: Dilaudid (HYDROmorphone) 1 mg Route: IVP; Site: Other;                            mw  

06:26 Follow up: Response: Pain is decreased                                                  mw  

07:32 Drug: Dilaudid (HYDROmorphone) 1 mg Route: IVP; Site: right antecubital;                ph  

08:00 Follow up: Response: No adverse reaction; Pain is decreased; RASS: Restless (+1)        ph  

07:35 Drug: Zofran (Ondansetron) 4 mg Route: IVP; Site: right antecubital;                    ph  

08:00 Follow up: Response: No adverse reaction                                                ph  

07:58 Not Given (Duplicate Order): NS 0.9% 1000 ml IV at 125 ml/hr continuous                 cynthia 

08:40 Drug: D5-1/2 NS with KCl 20 mEq/L 1000 ml Route: IV; Rate: 125 ml/hr; Site: right       ph  

      antecubital;                                                                                

10:45 Follow up: Response: No adverse reaction; IV Status: Infusion continued upon transfer   ph  

08:50 Drug: Dilaudid (HYDROmorphone) 1 mg Route: IVP; Site: right antecubital;                ph  

09:10 Follow up: Response: No adverse reaction; RASS: Restless (+1)                           ph  

09:45 Drug: Phenergan (promethazine) 12.5 mg Route: IVP; Site: right antecubital;             ph  

10:15 Follow up: Response: No adverse reaction; Nausea is decreased                           ph  

10:15 Drug: Dilaudid (HYDROmorphone) 1 mg Route: IVP; Site: right antecubital;                ph  

10:30 Follow up: Response: No adverse reaction; RASS: Restless (+1)                           ph  

                                                                                                  

                                                                                                  

Medication:                                                                                       

08:00 VIS not applicable for this client.                                                     ph  

                                                                                                  

Outcome:                                                                                          

07:57 ER care complete, transfer ordered by MD.                                               cynthia 

10:54 Patient left the ED.                                                                    ph  

10:54 Transferred by ground EMS [facility] to [facility], Transfer ph  

      form completed. X-rays sent w/ patient.                                                     

10:54 Condition: stable                                                                           

10:54 Instructed on the need for transfer.                                                        

                                                                                                  

Signatures:                                                                                       

Dispatcher [employer]                           EDMS                                                 

Lucrecia Mohr RN RN mw Anderson, Corey, MD MD cha Martinez, Abraham                               em1                                                  

Rebeca Li RN RN   [facility]igue[facility] MD MD   maCassy Becerril                                [facility]                                                  

Meche Hargrove                                                   

                                                                                                  

Corrections: (The following items were deleted from the chart)                                    

08:11 06:32 General: Appears [facility]  

09:54 09:42 Doc to doc report given; administrative approval given for transfer to [facility]       [address] [place] [address]                                                                               

                                                                                                  

**************************************************************************************************

## 2022-05-22 NOTE — RAD REPORT
EXAM DESCRIPTION:  CT - Chest Abdomen Pelvis W Cont - 5/22/2022 6:47 am

 

CLINICAL HISTORY:  back pain

 

COMPARISON:  Chest For Pe Angio dated 5/16/2022; Abdomen   Pelvis W Contrast dated 5/16/2022

 

TECHNIQUE:  Following dynamic enhancement using 100 milliliters nonionic IV contrast, axial imaging o
f the chest, abdomen and pelvis was performed.  Biphasic technique was utilized through the abdomen.

 

No oral contrast was administered.

 

All CT scans are performed using dose optimization technique as appropriate and may include automated
 exposure control or mA/KV adjustment according to patient size.

 

FINDINGS:  Large malignant mass is again noted centered in the left-side superior mediastinum. Lympha
denopathy extends into the base of neck with additional malignant mass or lymphadenopathy in the cent
ral mediastinum and subcarinal region. Pleural abutting mass the lateral aspect of the left upper lob
e 1.6 cm in size has not changed. Small left pleural effusion and left lower lobe atelectasis again n
oted. No pneumothorax. No significant aortic or pulmonary arterial tree finding. Small pericardial ef
fusion has not changed. No thoracic vertebral body or rib bone destructive process. No acute bone fin
ding in the chest. No chest wall mass or axillary lymphadenopathy.

 

No focal liver lesions seen. No portal vein thrombosis. Pancreas and spleen unremarkable. Cholecystec
janet clips are present. No abnormal biliary tree dilatation. Symmetric renal function is seen with no
 mass or hydronephrosis. No adrenal abnormalities.

 

No dilated bowel loops or focal bowel wall thickening. No acute GI findings seen.

 

Periaortic lymphadenopathy is similar to the May 16 study. Uterus and ovaries show no suspicious find
ings. Free fluid in the cul de sac not outside of physiologic limits.

 

No acute bone finding identified. Central canal detail is inherently limited on CT imaging. There is 
no evidence for central canal abscess or mass.

 

No significant vascular findings.

 

 

IMPRESSION:  Bulky malignant mass filling the mediastinum, with additional small masses as detailed, 
has show no measurable change from May 16 imaging.No acute CT chest finding since May 16.

 

Abdominal lymphadenopathy pattern is stable. No acute CT abdomen or pelvis finding.

 

No paraspinal mass or bone destructive process seen. Central canal detail is inherently limited. No g
ross evidence for central canal process.

## 2022-05-22 NOTE — XMS REPORT
Clinical Summary

                             Created on:May 22, 2022



Patient:Karo Beckham

Sex:Female

:1980

External Reference #:[id number]





Demographics







                          Address                   [address] [place] [id number]

 

                          Home Phone                [phone]

 

                          Mobile Phone              [phone]

 

                          Email Address             [email or web address]



 

                          Email Address             [email or web address]

[email or web address]

 

                          Preferred Language        English

 

                          Marital Status            Legally 

 

                          Mosque Affiliation     Unknown

 

                          Race                      Other Race

 

                          Ethnic Group               or 









Author







                          Organization              [facility] MD Bubba

[facility]

 

                          Address                   [address] [place] [address]









Support







                Name            Relationship    Address         Phone

 

                Bentley Herrera   Unavailable     Unavailable     [phone]









Care Team Providers







                    Name                Role                Phone

 

                    Unavailable         Primary Care Provider Unavailable









Allergies

Not on File



Medications

Not on file



Active Problems

Not on file



Encounters







             Date         Type         Specialty    Care Team    Description

 

             2022   Orders Only  Hematology   Henok Leggett MD 



after 2021



Social History







             Tobacco Use  Types        Packs/Day    Years Used   Date

 

             Never Assessed                                        









                          Sex Assigned at Birth     Date Recorded

 

                          Not on file               









                    Job Start Date      Occupation          Industry

 

                    Not on file         Not on file         Not on file







Last Filed Vital Signs

Not on file



Plan of Treatment

Not on file



Results

Not on fileafter 2021

## 2022-05-23 NOTE — RAD REPORT
EXAM DESCRIPTION:  RAD - Chest Single View - 5/22/2022 4:58 am

 

CLINICAL HISTORY:  The patient is 42 years old and is Female; CHEST PAIN

 

TECHNIQUE:  Single view of the chest.

 

COMPARISON:  May 16, 2022.

 

FINDINGS:  Lungs:   Faintly visualized pleural-based opacity in the left upper lobe increased in cons
picuity compared with the prior chest x-ray.

   Pleural space:   Left pleural effusion with left basilar atelectasis, not significantly changed.

         No pneumothorax.

   Heart:   Cardiomediastinal silhouette is not significantly changed given the differences in techni
que.

   Mediastinum:   See above.

   Bones/joints:   The bones and joints are unchanged as visualized.

   Lymph nodes:   Widened mediastinum is unchanged, correlating with the adenopathy seen on the recen
t CT.

   Upper abdomen:   No free air in the visualized upper abdomen.

 

IMPRESSION:  1.   Faintly visualized pleural-based opacity in the left upper lobe increased in conspi
cuity compared with the prior chest x-ray. This is best better visualized on the CT chest dated May 1
6, 2022.

2.   Left pleural effusion with left basilar atelectasis, not significantly changed.

3.   Widened mediastinum is unchanged, correlating with the adenopathy seen on the recent CT.

 

Electronically signed by:   Lillian Nunn MD   5/22/2022 5:52 AM CDT Workstation: [id number]

 

 

Due to temporary technical issues with the PACS/Fluency reporting system, reports are being signed by
 the in house radiologist without review as a courtesy to ensure prompt reporting. The interpreting r
adiologist is fully responsible for the content of the report.